# Patient Record
Sex: MALE | Race: WHITE | NOT HISPANIC OR LATINO | URBAN - METROPOLITAN AREA
[De-identification: names, ages, dates, MRNs, and addresses within clinical notes are randomized per-mention and may not be internally consistent; named-entity substitution may affect disease eponyms.]

---

## 2017-08-09 ENCOUNTER — OUTPATIENT (OUTPATIENT)
Dept: OUTPATIENT SERVICES | Facility: HOSPITAL | Age: 82
LOS: 1 days | End: 2017-08-09
Payer: MEDICARE

## 2017-08-09 DIAGNOSIS — C61 MALIGNANT NEOPLASM OF PROSTATE: ICD-10-CM

## 2017-08-09 PROCEDURE — 78306 BONE IMAGING WHOLE BODY: CPT | Mod: 26

## 2017-08-09 PROCEDURE — A9561: CPT

## 2017-08-09 PROCEDURE — 78803 RP LOCLZJ TUM SPECT 1 AREA: CPT

## 2017-08-09 PROCEDURE — 78320: CPT | Mod: 26

## 2017-08-09 PROCEDURE — 78306 BONE IMAGING WHOLE BODY: CPT

## 2017-09-02 ENCOUNTER — EMERGENCY (EMERGENCY)
Facility: HOSPITAL | Age: 82
LOS: 0 days | Discharge: HOME | End: 2017-09-02
Attending: EMERGENCY MEDICINE | Admitting: EMERGENCY MEDICINE
Payer: COMMERCIAL

## 2017-09-02 VITALS
WEIGHT: 184.97 LBS | HEIGHT: 71 IN | RESPIRATION RATE: 15 BRPM | OXYGEN SATURATION: 100 % | DIASTOLIC BLOOD PRESSURE: 64 MMHG | TEMPERATURE: 98 F | HEART RATE: 84 BPM | SYSTOLIC BLOOD PRESSURE: 102 MMHG

## 2017-09-02 VITALS
RESPIRATION RATE: 16 BRPM | SYSTOLIC BLOOD PRESSURE: 122 MMHG | DIASTOLIC BLOOD PRESSURE: 61 MMHG | TEMPERATURE: 98 F | OXYGEN SATURATION: 100 % | HEART RATE: 86 BPM

## 2017-09-02 DIAGNOSIS — E78.5 HYPERLIPIDEMIA, UNSPECIFIED: ICD-10-CM
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
## 2017-09-02 DIAGNOSIS — T18.2XXA FOREIGN BODY IN STOMACH, INITIAL ENCOUNTER: ICD-10-CM

## 2017-09-02 DIAGNOSIS — I10 ESSENTIAL (PRIMARY) HYPERTENSION: ICD-10-CM

## 2017-09-02 DIAGNOSIS — I48.91 UNSPECIFIED ATRIAL FIBRILLATION: ICD-10-CM

## 2017-09-02 DIAGNOSIS — Z79.01 LONG TERM (CURRENT) USE OF ANTICOAGULANTS: ICD-10-CM
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
## 2017-09-02 LAB
ALBUMIN SERPL ELPH-MCNC: 3.3 G/DL — SIGNIFICANT CHANGE UP (ref 3.3–5)
ALP SERPL-CCNC: 106 U/L — SIGNIFICANT CHANGE UP (ref 40–120)
ALT FLD-CCNC: 25 U/L — SIGNIFICANT CHANGE UP (ref 12–78)
ANION GAP SERPL CALC-SCNC: 5 MMOL/L — SIGNIFICANT CHANGE UP (ref 5–17)
APTT BLD: 37.2 SEC — SIGNIFICANT CHANGE UP (ref 27.5–37.4)
AST SERPL-CCNC: 21 U/L — SIGNIFICANT CHANGE UP (ref 15–37)
BASOPHILS # BLD AUTO: 0.2 K/UL — SIGNIFICANT CHANGE UP (ref 0–0.2)
BASOPHILS NFR BLD AUTO: 1 % — SIGNIFICANT CHANGE UP (ref 0–2)
BILIRUB SERPL-MCNC: 0.2 MG/DL — SIGNIFICANT CHANGE UP (ref 0.2–1.2)
BLD GP AB SCN SERPL QL: SIGNIFICANT CHANGE UP
BUN SERPL-MCNC: 21 MG/DL — SIGNIFICANT CHANGE UP (ref 7–23)
CALCIUM SERPL-MCNC: 9.1 MG/DL — SIGNIFICANT CHANGE UP (ref 8.5–10.1)
CHLORIDE SERPL-SCNC: 100 MMOL/L — SIGNIFICANT CHANGE UP (ref 96–108)
CO2 SERPL-SCNC: 29 MMOL/L — SIGNIFICANT CHANGE UP (ref 22–31)
CREAT SERPL-MCNC: 0.81 MG/DL — SIGNIFICANT CHANGE UP (ref 0.5–1.3)
EOSINOPHIL # BLD AUTO: 0.3 K/UL — SIGNIFICANT CHANGE UP (ref 0–0.5)
EOSINOPHIL NFR BLD AUTO: 3 % — SIGNIFICANT CHANGE UP (ref 0–6)
GLUCOSE SERPL-MCNC: 96 MG/DL — SIGNIFICANT CHANGE UP (ref 70–99)
HCT VFR BLD CALC: 34.2 % — LOW (ref 39–50)
HGB BLD-MCNC: 11.9 G/DL — LOW (ref 13–17)
INR BLD: 2.75 RATIO — HIGH (ref 0.88–1.16)
LYMPHOCYTES # BLD AUTO: 1.9 K/UL — SIGNIFICANT CHANGE UP (ref 1–3.3)
LYMPHOCYTES # BLD AUTO: 13 % — SIGNIFICANT CHANGE UP (ref 13–44)
MANUAL DIF COMMENT BLD-IMP: SIGNIFICANT CHANGE UP
MCHC RBC-ENTMCNC: 32.6 PG — SIGNIFICANT CHANGE UP (ref 27–34)
MCHC RBC-ENTMCNC: 34.8 GM/DL — SIGNIFICANT CHANGE UP (ref 32–36)
MCV RBC AUTO: 93.7 FL — SIGNIFICANT CHANGE UP (ref 80–100)
MONOCYTES # BLD AUTO: 0.9 K/UL — SIGNIFICANT CHANGE UP (ref 0–0.9)
MONOCYTES NFR BLD AUTO: 10 % — SIGNIFICANT CHANGE UP (ref 2–14)
NEUTROPHILS # BLD AUTO: 5.5 K/UL — SIGNIFICANT CHANGE UP (ref 1.8–7.4)
NEUTROPHILS NFR BLD AUTO: 65 % — SIGNIFICANT CHANGE UP (ref 43–77)
PLAT MORPH BLD: NORMAL — SIGNIFICANT CHANGE UP
PLATELET # BLD AUTO: 234 K/UL — SIGNIFICANT CHANGE UP (ref 150–400)
POTASSIUM SERPL-MCNC: 4.5 MMOL/L — SIGNIFICANT CHANGE UP (ref 3.5–5.3)
POTASSIUM SERPL-SCNC: 4.5 MMOL/L — SIGNIFICANT CHANGE UP (ref 3.5–5.3)
PROT SERPL-MCNC: 6.5 GM/DL — SIGNIFICANT CHANGE UP (ref 6–8.3)
PROTHROM AB SERPL-ACNC: 30.3 SEC — HIGH (ref 9.8–12.7)
RBC # BLD: 3.65 M/UL — LOW (ref 4.2–5.8)
RBC # FLD: 10.9 % — SIGNIFICANT CHANGE UP (ref 10.3–14.5)
RBC BLD AUTO: SIGNIFICANT CHANGE UP
SODIUM SERPL-SCNC: 134 MMOL/L — LOW (ref 135–145)
TYPE + AB SCN PNL BLD: SIGNIFICANT CHANGE UP
VARIANT LYMPHS # BLD: 8 % — HIGH (ref 0–6)
WBC # BLD: 8.8 K/UL — SIGNIFICANT CHANGE UP (ref 3.8–10.5)
WBC # FLD AUTO: 8.8 K/UL — SIGNIFICANT CHANGE UP (ref 3.8–10.5)

## 2017-09-02 PROCEDURE — 93010 ELECTROCARDIOGRAM REPORT: CPT

## 2017-09-02 PROCEDURE — 71010: CPT | Mod: 26

## 2017-09-02 PROCEDURE — 99285 EMERGENCY DEPT VISIT HI MDM: CPT

## 2017-09-02 PROCEDURE — 74000: CPT | Mod: 26

## 2017-09-02 RX ORDER — SODIUM CHLORIDE 9 MG/ML
3 INJECTION INTRAMUSCULAR; INTRAVENOUS; SUBCUTANEOUS ONCE
Qty: 0 | Refills: 0 | Status: COMPLETED | OUTPATIENT
Start: 2017-09-02 | End: 2017-09-02

## 2017-09-02 RX ADMIN — SODIUM CHLORIDE 3 MILLILITER(S): 9 INJECTION INTRAMUSCULAR; INTRAVENOUS; SUBCUTANEOUS at 17:57

## 2017-09-02 NOTE — ED ADULT NURSE NOTE - FALL HARM RISK
age(85 years old or older)/bones(Osteoporosis,prev fx,steroid use,metastatic bone ca/surgery/coagulation(Bleeding disorder R/T clinical cond/anti-coags)

## 2017-09-02 NOTE — ED PROVIDER NOTE - OBJECTIVE STATEMENT
85 y/o male pmhx dementia, AFIB on Coumadin, HTN, HLD presents to ED from [facility] due to swallowing a metal soda can top ~4pm today. As per wife pt tried to cough up top but was unable to do so. Denies vomiting.

## 2017-09-02 NOTE — ED ADULT NURSE NOTE - OBJECTIVE STATEMENT
Pt comes from [facility], sent to  d/t swallowing the top of a soda can at 4pm today. Pt currently in no acute distress, no SOB and states he feels fine.

## 2017-09-02 NOTE — ED ADULT TRIAGE NOTE - CHIEF COMPLAINT QUOTE
Patient coming from [facility]. Pt swallowed the metal piece of a soda can. Patient speaking full clear complete sentences in no acute distress.

## 2017-09-02 NOTE — ED PROVIDER NOTE - PROGRESS NOTE DETAILS
Attending Stephanie: Phone call to on call GI Dr Avendaño to discuss case. Currently does not rec endoscopy or admission, expectant management with close obs and rted for any abd pain or blood in stool. Discussed this with wife and [facility]

## 2017-09-02 NOTE — ED ADULT NURSE NOTE - CHIEF COMPLAINT QUOTE
Patient coming from [facility]. Pt swallowed the metal piece of a soda can. Patient speaking full clear complete sentences in no acute distress.

## 2017-11-24 ENCOUNTER — EMERGENCY (EMERGENCY)
Facility: HOSPITAL | Age: 82
LOS: 0 days | Discharge: HOME | End: 2017-11-24
Attending: EMERGENCY MEDICINE | Admitting: EMERGENCY MEDICINE
Payer: MEDICARE

## 2017-11-24 VITALS
TEMPERATURE: 98 F | DIASTOLIC BLOOD PRESSURE: 74 MMHG | SYSTOLIC BLOOD PRESSURE: 130 MMHG | RESPIRATION RATE: 18 BRPM | HEART RATE: 80 BPM | OXYGEN SATURATION: 99 %

## 2017-11-24 VITALS — WEIGHT: 125 LBS

## 2017-11-24 DIAGNOSIS — G31.9 DEGENERATIVE DISEASE OF NERVOUS SYSTEM, UNSPECIFIED: ICD-10-CM

## 2017-11-24 DIAGNOSIS — Z91.81 HISTORY OF FALLING: ICD-10-CM

## 2017-11-24 DIAGNOSIS — Z79.01 LONG TERM (CURRENT) USE OF ANTICOAGULANTS: ICD-10-CM

## 2017-11-24 DIAGNOSIS — I48.91 UNSPECIFIED ATRIAL FIBRILLATION: ICD-10-CM

## 2017-11-24 PROCEDURE — 70450 CT HEAD/BRAIN W/O DYE: CPT | Mod: 26

## 2017-11-24 PROCEDURE — 93010 ELECTROCARDIOGRAM REPORT: CPT

## 2017-11-24 PROCEDURE — 99285 EMERGENCY DEPT VISIT HI MDM: CPT

## 2017-11-24 NOTE — ED PROVIDER NOTE - OBJECTIVE STATEMENT
85 y/o M with PMhx of afib on coumadin presents to the ED s/p fall one hour ago. Pt tripped in his kitchen and hit his head. No LOC. Trauma alert called. PMD: Dr. Villegas.

## 2018-05-11 ENCOUNTER — INPATIENT (INPATIENT)
Facility: HOSPITAL | Age: 83
LOS: 2 days | Discharge: NURSING, REHAB OR LONG-TERM CARE | End: 2018-05-14
Attending: FAMILY MEDICINE | Admitting: FAMILY MEDICINE
Payer: MEDICARE

## 2018-05-11 VITALS
HEART RATE: 77 BPM | TEMPERATURE: 97 F | OXYGEN SATURATION: 94 % | DIASTOLIC BLOOD PRESSURE: 85 MMHG | WEIGHT: 115.08 LBS | SYSTOLIC BLOOD PRESSURE: 148 MMHG | RESPIRATION RATE: 17 BRPM

## 2018-05-11 LAB
ALBUMIN SERPL ELPH-MCNC: 3.9 G/DL — SIGNIFICANT CHANGE UP (ref 3.3–5)
ALP SERPL-CCNC: 120 U/L — SIGNIFICANT CHANGE UP (ref 40–120)
ALT FLD-CCNC: 18 U/L — SIGNIFICANT CHANGE UP (ref 12–78)
ANION GAP SERPL CALC-SCNC: 8 MMOL/L — SIGNIFICANT CHANGE UP (ref 5–17)
APPEARANCE UR: CLEAR — SIGNIFICANT CHANGE UP
APTT BLD: 33.6 SEC — SIGNIFICANT CHANGE UP (ref 27.5–37.4)
AST SERPL-CCNC: 23 U/L — SIGNIFICANT CHANGE UP (ref 15–37)
BACTERIA # UR AUTO: (no result)
BASOPHILS # BLD AUTO: 0.06 K/UL — SIGNIFICANT CHANGE UP (ref 0–0.2)
BASOPHILS NFR BLD AUTO: 0.7 % — SIGNIFICANT CHANGE UP (ref 0–2)
BILIRUB SERPL-MCNC: 0.7 MG/DL — SIGNIFICANT CHANGE UP (ref 0.2–1.2)
BILIRUB UR-MCNC: NEGATIVE — SIGNIFICANT CHANGE UP
BLD GP AB SCN SERPL QL: SIGNIFICANT CHANGE UP
BUN SERPL-MCNC: 15 MG/DL — SIGNIFICANT CHANGE UP (ref 7–23)
CALCIUM SERPL-MCNC: 9.7 MG/DL — SIGNIFICANT CHANGE UP (ref 8.5–10.1)
CHLORIDE SERPL-SCNC: 97 MMOL/L — SIGNIFICANT CHANGE UP (ref 96–108)
CK SERPL-CCNC: 94 U/L — SIGNIFICANT CHANGE UP (ref 26–308)
CO2 SERPL-SCNC: 27 MMOL/L — SIGNIFICANT CHANGE UP (ref 22–31)
COLOR SPEC: YELLOW — SIGNIFICANT CHANGE UP
COMMENT - URINE: SIGNIFICANT CHANGE UP
CREAT SERPL-MCNC: 1.03 MG/DL — SIGNIFICANT CHANGE UP (ref 0.5–1.3)
DIFF PNL FLD: (no result)
EOSINOPHIL # BLD AUTO: 0.14 K/UL — SIGNIFICANT CHANGE UP (ref 0–0.5)
EOSINOPHIL NFR BLD AUTO: 1.7 % — SIGNIFICANT CHANGE UP (ref 0–6)
GLUCOSE SERPL-MCNC: 90 MG/DL — SIGNIFICANT CHANGE UP (ref 70–99)
GLUCOSE UR QL: NEGATIVE MG/DL — SIGNIFICANT CHANGE UP
HCT VFR BLD CALC: 39.4 % — SIGNIFICANT CHANGE UP (ref 39–50)
HGB BLD-MCNC: 13.7 G/DL — SIGNIFICANT CHANGE UP (ref 13–17)
IMM GRANULOCYTES NFR BLD AUTO: 0.6 % — SIGNIFICANT CHANGE UP (ref 0–1.5)
INR BLD: 2.31 RATIO — HIGH (ref 0.88–1.16)
KETONES UR-MCNC: (no result)
LEUKOCYTE ESTERASE UR-ACNC: (no result)
LIDOCAIN IGE QN: 225 U/L — SIGNIFICANT CHANGE UP (ref 73–393)
LYMPHOCYTES # BLD AUTO: 1.94 K/UL — SIGNIFICANT CHANGE UP (ref 1–3.3)
LYMPHOCYTES # BLD AUTO: 23.9 % — SIGNIFICANT CHANGE UP (ref 13–44)
MCHC RBC-ENTMCNC: 31.9 PG — SIGNIFICANT CHANGE UP (ref 27–34)
MCHC RBC-ENTMCNC: 34.8 GM/DL — SIGNIFICANT CHANGE UP (ref 32–36)
MCV RBC AUTO: 91.6 FL — SIGNIFICANT CHANGE UP (ref 80–100)
MONOCYTES # BLD AUTO: 0.8 K/UL — SIGNIFICANT CHANGE UP (ref 0–0.9)
MONOCYTES NFR BLD AUTO: 9.9 % — SIGNIFICANT CHANGE UP (ref 2–14)
NEUTROPHILS # BLD AUTO: 5.13 K/UL — SIGNIFICANT CHANGE UP (ref 1.8–7.4)
NEUTROPHILS NFR BLD AUTO: 63.2 % — SIGNIFICANT CHANGE UP (ref 43–77)
NITRITE UR-MCNC: NEGATIVE — SIGNIFICANT CHANGE UP
NRBC # BLD: 0 /100 WBCS — SIGNIFICANT CHANGE UP (ref 0–0)
PH UR: 8 — SIGNIFICANT CHANGE UP (ref 5–8)
PLATELET # BLD AUTO: 222 K/UL — SIGNIFICANT CHANGE UP (ref 150–400)
POTASSIUM SERPL-MCNC: 4.3 MMOL/L — SIGNIFICANT CHANGE UP (ref 3.5–5.3)
POTASSIUM SERPL-SCNC: 4.3 MMOL/L — SIGNIFICANT CHANGE UP (ref 3.5–5.3)
PROT SERPL-MCNC: 7.8 GM/DL — SIGNIFICANT CHANGE UP (ref 6–8.3)
PROT UR-MCNC: NEGATIVE MG/DL — SIGNIFICANT CHANGE UP
PROTHROM AB SERPL-ACNC: 25.4 SEC — HIGH (ref 9.8–12.7)
RBC # BLD: 4.3 M/UL — SIGNIFICANT CHANGE UP (ref 4.2–5.8)
RBC # FLD: 12.7 % — SIGNIFICANT CHANGE UP (ref 10.3–14.5)
RBC CASTS # UR COMP ASSIST: NEGATIVE /HPF — SIGNIFICANT CHANGE UP (ref 0–4)
SODIUM SERPL-SCNC: 132 MMOL/L — LOW (ref 135–145)
SP GR SPEC: 1.01 — SIGNIFICANT CHANGE UP (ref 1.01–1.02)
TROPONIN I SERPL-MCNC: 0.02 NG/ML — SIGNIFICANT CHANGE UP (ref 0.01–0.04)
TYPE + AB SCN PNL BLD: SIGNIFICANT CHANGE UP
UROBILINOGEN FLD QL: NEGATIVE MG/DL — SIGNIFICANT CHANGE UP
WBC # BLD: 8.12 K/UL — SIGNIFICANT CHANGE UP (ref 3.8–10.5)
WBC # FLD AUTO: 8.12 K/UL — SIGNIFICANT CHANGE UP (ref 3.8–10.5)
WBC UR QL: SIGNIFICANT CHANGE UP

## 2018-05-11 PROCEDURE — 73502 X-RAY EXAM HIP UNI 2-3 VIEWS: CPT | Mod: 26

## 2018-05-11 PROCEDURE — 70450 CT HEAD/BRAIN W/O DYE: CPT | Mod: 26

## 2018-05-11 PROCEDURE — 73030 X-RAY EXAM OF SHOULDER: CPT | Mod: 26,LT

## 2018-05-11 PROCEDURE — 73552 X-RAY EXAM OF FEMUR 2/>: CPT | Mod: 26

## 2018-05-11 PROCEDURE — 93010 ELECTROCARDIOGRAM REPORT: CPT

## 2018-05-11 PROCEDURE — 71045 X-RAY EXAM CHEST 1 VIEW: CPT | Mod: 26

## 2018-05-11 PROCEDURE — 99285 EMERGENCY DEPT VISIT HI MDM: CPT

## 2018-05-11 PROCEDURE — 74176 CT ABD & PELVIS W/O CONTRAST: CPT | Mod: 26

## 2018-05-11 PROCEDURE — 73060 X-RAY EXAM OF HUMERUS: CPT | Mod: 26,LT

## 2018-05-11 PROCEDURE — 73080 X-RAY EXAM OF ELBOW: CPT | Mod: 26,LT

## 2018-05-11 PROCEDURE — 72125 CT NECK SPINE W/O DYE: CPT | Mod: 26

## 2018-05-11 PROCEDURE — 71250 CT THORAX DX C-: CPT | Mod: 26

## 2018-05-11 RX ORDER — QUETIAPINE FUMARATE 200 MG/1
37.5 TABLET, FILM COATED ORAL
Qty: 0 | Refills: 0 | Status: DISCONTINUED | OUTPATIENT
Start: 2018-05-11 | End: 2018-05-12

## 2018-05-11 RX ORDER — QUETIAPINE FUMARATE 200 MG/1
1.5 TABLET, FILM COATED ORAL
Qty: 0 | Refills: 0 | COMMUNITY

## 2018-05-11 RX ORDER — QUETIAPINE FUMARATE 200 MG/1
2.5 TABLET, FILM COATED ORAL
Qty: 0 | Refills: 0 | COMMUNITY

## 2018-05-11 RX ORDER — PHYTONADIONE (VIT K1) 5 MG
5 TABLET ORAL ONCE
Qty: 0 | Refills: 0 | Status: COMPLETED | OUTPATIENT
Start: 2018-05-11 | End: 2018-05-11

## 2018-05-11 RX ORDER — DONEPEZIL HYDROCHLORIDE 10 MG/1
10 TABLET, FILM COATED ORAL AT BEDTIME
Qty: 0 | Refills: 0 | Status: DISCONTINUED | OUTPATIENT
Start: 2018-05-11 | End: 2018-05-12

## 2018-05-11 RX ORDER — DIVALPROEX SODIUM 500 MG/1
1 TABLET, DELAYED RELEASE ORAL
Qty: 0 | Refills: 0 | COMMUNITY

## 2018-05-11 RX ORDER — HYDROMORPHONE HYDROCHLORIDE 2 MG/ML
1 INJECTION INTRAMUSCULAR; INTRAVENOUS; SUBCUTANEOUS ONCE
Qty: 0 | Refills: 0 | Status: DISCONTINUED | OUTPATIENT
Start: 2018-05-11 | End: 2018-05-11

## 2018-05-11 RX ORDER — OXYBUTYNIN CHLORIDE 5 MG
1 TABLET ORAL
Qty: 0 | Refills: 0 | COMMUNITY

## 2018-05-11 RX ORDER — MORPHINE SULFATE 50 MG/1
4 CAPSULE, EXTENDED RELEASE ORAL EVERY 4 HOURS
Qty: 0 | Refills: 0 | Status: DISCONTINUED | OUTPATIENT
Start: 2018-05-11 | End: 2018-05-12

## 2018-05-11 RX ORDER — SODIUM CHLORIDE 9 MG/ML
1000 INJECTION, SOLUTION INTRAVENOUS
Qty: 0 | Refills: 0 | Status: DISCONTINUED | OUTPATIENT
Start: 2018-05-11 | End: 2018-05-11

## 2018-05-11 RX ORDER — SODIUM CHLORIDE 9 MG/ML
500 INJECTION INTRAMUSCULAR; INTRAVENOUS; SUBCUTANEOUS ONCE
Qty: 0 | Refills: 0 | Status: COMPLETED | OUTPATIENT
Start: 2018-05-11 | End: 2018-05-11

## 2018-05-11 RX ORDER — POTASSIUM CHLORIDE 20 MEQ
20 PACKET (EA) ORAL DAILY
Qty: 0 | Refills: 0 | Status: DISCONTINUED | OUTPATIENT
Start: 2018-05-11 | End: 2018-05-12

## 2018-05-11 RX ORDER — MORPHINE SULFATE 50 MG/1
2 CAPSULE, EXTENDED RELEASE ORAL EVERY 4 HOURS
Qty: 0 | Refills: 0 | Status: DISCONTINUED | OUTPATIENT
Start: 2018-05-11 | End: 2018-05-12

## 2018-05-11 RX ORDER — TRAMADOL HYDROCHLORIDE 50 MG/1
25 TABLET ORAL EVERY 6 HOURS
Qty: 0 | Refills: 0 | Status: DISCONTINUED | OUTPATIENT
Start: 2018-05-11 | End: 2018-05-12

## 2018-05-11 RX ORDER — DIVALPROEX SODIUM 500 MG/1
250 TABLET, DELAYED RELEASE ORAL
Qty: 0 | Refills: 0 | Status: DISCONTINUED | OUTPATIENT
Start: 2018-05-11 | End: 2018-05-12

## 2018-05-11 RX ORDER — SODIUM CHLORIDE 9 MG/ML
1000 INJECTION INTRAMUSCULAR; INTRAVENOUS; SUBCUTANEOUS
Qty: 0 | Refills: 0 | Status: DISCONTINUED | OUTPATIENT
Start: 2018-05-11 | End: 2018-05-12

## 2018-05-11 RX ORDER — QUETIAPINE FUMARATE 200 MG/1
75 TABLET, FILM COATED ORAL
Qty: 0 | Refills: 0 | Status: DISCONTINUED | OUTPATIENT
Start: 2018-05-11 | End: 2018-05-12

## 2018-05-11 RX ADMIN — DONEPEZIL HYDROCHLORIDE 10 MILLIGRAM(S): 10 TABLET, FILM COATED ORAL at 22:36

## 2018-05-11 RX ADMIN — SODIUM CHLORIDE 500 MILLILITER(S): 9 INJECTION INTRAMUSCULAR; INTRAVENOUS; SUBCUTANEOUS at 13:40

## 2018-05-11 RX ADMIN — HYDROMORPHONE HYDROCHLORIDE 1 MILLIGRAM(S): 2 INJECTION INTRAMUSCULAR; INTRAVENOUS; SUBCUTANEOUS at 13:13

## 2018-05-11 RX ADMIN — Medication 5 MILLIGRAM(S): at 19:34

## 2018-05-11 RX ADMIN — TRAMADOL HYDROCHLORIDE 25 MILLIGRAM(S): 50 TABLET ORAL at 22:36

## 2018-05-11 RX ADMIN — QUETIAPINE FUMARATE 75 MILLIGRAM(S): 200 TABLET, FILM COATED ORAL at 22:37

## 2018-05-11 RX ADMIN — TRAMADOL HYDROCHLORIDE 25 MILLIGRAM(S): 50 TABLET ORAL at 22:53

## 2018-05-11 RX ADMIN — HYDROMORPHONE HYDROCHLORIDE 1 MILLIGRAM(S): 2 INJECTION INTRAMUSCULAR; INTRAVENOUS; SUBCUTANEOUS at 19:49

## 2018-05-11 NOTE — CONSULT NOTE ADULT - SUBJECTIVE AND OBJECTIVE BOX
86y Male ambulator with a walker presents c/o L shoulder/hip pain and inability to ambulate sp mechanical fall. Pt has dementia and is a poor historian. History obtained from Pt's wife at bedside. Pt's wife states Pt slipped out of wheelchair and fell to the ground. Pt was unable to ambulate after the fall. Denies HS/LOC. Denies numbness/tingling. Denies pain/injury elsewhere. Of note, Pt had previous R hip hemiarthroplasty in 2016 by Dr. Meza. Pt also has had R knee arthroscopy many years ago by a surgeon who is now retired.    CONSTITUTIONAL: No fever or chills  HEENT:  No headache, no sore throat  RESPIRATORY: No cough, wheezing, or shortness of breath  CARDIOVASCULAR: No chest pain, palpitations, or leg swelling  GASTROINTESTINAL: No nausea, vomiting, or diarrhea  GENITOURINARY: No dysuria, frequency, or hematuria  NEUROLOGICAL: no focal weakness or dizziness  SKIN:  No rashes or lesions   MUSCULOSKELETAL: no myalgias   PSYCHIATRIC: No depression or anxiety    PAST MEDICAL & SURGICAL HISTORY:  Dementia without behavioral disturbance, unspecified dementia type  Afib  No significant past surgical history    Home Medications:  Cardizem  mg/24 hours oral capsule, extended release: 1 cap(s) orally once a day (11 May 2018 17:40)  Coumadin 5 mg oral tablet: 1 tab(s) orally once a day (11 May 2018 17:40)  divalproex sodium 250 mg oral delayed release tablet: 1 tab(s) orally 2 times a day (11 May 2018 17:40)  donepezil 10 mg oral tablet: 1 tab(s) orally once a day (at bedtime) (11 May 2018 17:40)  furosemide 20 mg oral tablet: 1 tab(s) orally once a day (11 May 2018 17:40)  furosemide 20 mg oral tablet: 1 tab(s) orally once a day (11 May 2018 17:40)  oxybutynin 5 mg oral tablet: 1 tab(s) orally 2 times a day (11 May 2018 17:40)  potassium chloride 20 mEq oral tablet, extended release: 1 tab(s) orally once a day (11 May 2018 17:40)  QUEtiapine 25 mg oral tablet: 1.5 tab(s) orally once a day in the afternoon (11 May 2018 17:40)  QUEtiapine 25 mg oral tablet: 2.5 tab(s) orally once a day (in the morning) (11 May 2018 17:40)  QUEtiapine 50 mg oral tablet: 1.5 tab(s) orally once a day (at bedtime) (11 May 2018 17:40)  Vitamin B-12 1000 mcg oral tablet: 1 tab(s) orally once a day (11 May 2018 17:40)  Vitamin D3 1000 intl units oral capsule: 1 cap(s) orally once a day (11 May 2018 17:40)      Allergies    No Known Allergies    Intolerances                              13.7   8.12  )-----------( 222      ( 11 May 2018 13:04 )             39.4     11 May 2018 13:04    132    |  97     |  15     ----------------------------<  90     4.3     |  27     |  1.03     Ca    9.7        11 May 2018 13:04    TPro  7.8    /  Alb  3.9    /  TBili  0.7    /  DBili  x      /  AST  23     /  ALT  18     /  AlkPhos  120    11 May 2018 13:04    PT/INR - ( 11 May 2018 13:04 )   PT: 25.4 sec;   INR: 2.31 ratio         PTT - ( 11 May 2018 13:04 )  PTT:33.6 sec  Vital Signs Last 24 Hrs  T(C): 36.7 (05-11-18 @ 16:02), Max: 36.7 (05-11-18 @ 16:02)  T(F): 98 (05-11-18 @ 16:02), Max: 98 (05-11-18 @ 16:02)  HR: 96 (05-11-18 @ 16:02) (77 - 96)  BP: 108/72 (05-11-18 @ 16:02) (108/72 - 148/85)  BP(mean): --  RR: 17 (05-11-18 @ 12:39) (17 - 17)  SpO2: 99% (05-11-18 @ 16:02) (94% - 99%)    Imaging: XR L Hip: Intertrochanteric hip fracture  XR L Shoulder: Nondisplaced proximal humerus fx    Physical Exam  Gen: NAD  LLE: skin intact, short/ER leg, unable to SLR LLE, + log roll LLE, +ttp hip/groin, no ttp elsewhere, +ehl/fhl/ta/gs function, SILT L3-S1, no calf ttp, +dp/pt pulse intact, compartments soft    LUE: Skin intact, moderate swelling to L shoulder, +ttp over shoulder, no ttp elsewhere, +AIN/PIN/M/R/U/Msc/Ax nerves intact, SILT C5-T1, +radial pulse, compartments soft/compressible, full painless ROM at elbow/wrist      Secondary survey: benign, nv intact, able to SLR contralateral leg, negative log roll contralateral leg, no bony ttp elsewhere

## 2018-05-11 NOTE — ED PROVIDER NOTE - MEDICAL DECISION MAKING DETAILS
Plan for labs including cardiac workup, CT abd/ pelvis, CT c-spine, CT head, CXR, XR elbow, XR hip, EKG.

## 2018-05-11 NOTE — H&P ADULT - NSHPPHYSICALEXAM_GEN_ALL_CORE
PHYSICAL EXAM:    GENERAL: NAD, sleepy  HEAD:  NC/AT  EYES: EOMI, PERRLA, no scleral icterus  HEENT: Moist mucous membranes  NECK: Supple, No JVD  CNS:  Alert & Oriented X3, Motor Strength 5/5 B/L lower extremities; 5/5 in right upper extremity.  left arm in sling.  DTRs 2+ intact   LUNG: Clear to auscultation bilaterally; No rales, rhonchi, wheezing, or rubs  HEART: RRR; No murmurs, rubs, or gallops  ABDOMEN: +BS, ST/ND/NT  GENITOURINARY- Voiding, Bladder not distended  EXTREMITIES:  2+ Peripheral Pulses, No clubbing, cyanosis, or edema  MUSCULOSKELTAL- Joints normal ROM, no Muscle or joint tenderness PHYSICAL EXAM:    GENERAL: NAD, sleepy  HEAD:  NC/AT  EYES: EOMI, PERRLA, no scleral icterus  HEENT: Moist mucous membranes  NECK: Supple, No JVD  CNS:  Alert & Oriented X3, Motor Strength 5/5 right lower extremity;  left leg- shortened with internal rotation, 5/5 in right upper extremity.  left arm in sling.  DTRs 2+ intact   LUNG: Clear to auscultation bilaterally; No rales, rhonchi, wheezing, or rubs  HEART: RRR; No murmurs, rubs, or gallops  ABDOMEN: +BS, ST/ND/NT  GENITOURINARY- Voiding, Bladder not distended  EXTREMITIES:  2+ Peripheral Pulses, No clubbing, cyanosis, or edema  MUSCULOSKELTAL- Joints normal ROM, no Muscle or joint tenderness

## 2018-05-11 NOTE — ED ADULT NURSE REASSESSMENT NOTE - NS ED NURSE REASSESS COMMENT FT1
Notified Dr. Mateusz Sandra of discrepancy between order written to admit to Med-Surg Unit and H&P which states admit to Telemetry.  As per MD Sandra, patient is to be admitted to Med-Surg Unit.

## 2018-05-11 NOTE — H&P ADULT - ASSESSMENT
85 y/o male     mechanical fall, no head injury or LOC, Cth-neg, with left intertochanteric fx, plan to go to OR tomorrow, h/o afib on vka, with inr>2.  no s/s of fluid overload or LE edema, no murmur appreciated. Caldera index for cardiac risk-0.4%, patient surgically risk is moderate, surgical risk moderate. patient is medically optimized for surgery, pending INR level<2.    -admit to tele   -ortho consult  -hold vka tonight  -will give vit k, check inr in am   -gentle hydration   -npo p mn   -pain meds as needed    afib, rate controlled.  ecg-neg  -hold am po meds, treat with iv push as needed    dementia   -cont aricept     hld  -cont statin    dvt prophylaxis   -on vka, inr>2  -ipc bilaterally 87 y/o male     mechanical fall, no head injury or LOC, Cth-neg, with left intertochanteric hip fx, plan to go to OR tomorrow, h/o afib on vka, with inr>2.  no s/s of fluid overload or LE edema, no murmur appreciated. Caldera index for cardiac risk-0.4%, patient surgically risk is moderate, surgical risk moderate. patient is medically optimized for surgery, pending INR level<2.    -admit to tele   -ortho consult  -hold vka tonight  -will give vit k, check inr in am   -gentle hydration   -npo p mn   -pain meds as needed    afib, rate controlled.  bp borderline.  ecg-afib, no acute changes  -hold am po med as on long acting ccb, treat with iv push as needed    dementia,  -cont aricept   -cont seroquel, depakote    dvt prophylaxis   -on vka, inr>2  -ipc bilaterally 85 y/o male     mechanical fall, no head injury or LOC, Cth-neg, with left intertochanteric hip fx, plan to go to OR tomorrow, h/o afib on vka, with inr>2.  no s/s of fluid overload or LE edema, no murmur appreciated. Caldera index for cardiac risk-0.4%, patient surgically risk is moderate, surgical risk moderate. patient is medically optimized for surgery, pending INR level<2.    -admit to med-surg  -ortho consult  -hold vka tonight  -will give vit k, check inr in am   -gentle hydration   -npo p mn   -pain meds as needed    afib, rate controlled.  bp borderline.  ecg-afib, no acute changes  -hold am po med as on long acting ccb, treat with iv push as needed    dementia,  -cont aricept   -cont seroquel, depakote    dvt prophylaxis   -on vka, inr>2  -ipc bilaterally

## 2018-05-11 NOTE — H&P ADULT - HISTORY OF PRESENT ILLNESS
87 y/o mainly wheelchair bound male with h/o afib on vka and dementia presents after fall out of his wheelchair at home today.  history provided by wife as patient is sleeping due to pain meds.  states that he was leaning over out of chair and fell on his left side.  no LOC, head trauma.  wife and aide witnessed fall.    In ED, noted to have left intertrochanteric fx, s/p dilaudid for pain. 85 y/o mainly wheelchair bound male with h/o afib on vka and dementia presents after fall out of his wheelchair at home today.  history provided by wife as patient has baseline dementia.  states that he was leaning over out of chair and fell on his left side.  unable to ambulate, c/o severe pain.  EMS was called.  no LOC, head trauma.  wife and home aide witnessed fall.    In ED, noted to have left intertrochanteric fx, s/p dilaudid for pain.

## 2018-05-11 NOTE — ED ADULT TRIAGE NOTE - CHIEF COMPLAINT QUOTE
pt presents to ED due to possible left hip fx and left arm pain pt was in his wheelchair on his lawn and slipped out onto his lawn landing on left hip hx of BL hip fx

## 2018-05-11 NOTE — ED PROVIDER NOTE - OBJECTIVE STATEMENT
85 y/o male with PMHx of Afib on coumadin, right partial hip replacement presents to the ED BIBA s/p fall at home PTA c/o left arm pain and left hip pain. Pt was unable to ambulate s/p fall and was lifted back into his wheel chair. Denies head trauma, LOC, NVD.

## 2018-05-11 NOTE — CONSULT NOTE ADULT - ASSESSMENT
A/P: 86y Male with L hip and L proximal humerus fracture  Pain control  NWB LLE, bedrest  NWB LUE in sling  FU labs/imaging  Admit to medical team  Medical clearance/optimization for OR  NPO after midnight for OR tomorrow 5/12  IVF while NPO  Hold chemical DVT ppx after midnight  Will give 5mg Vit K for elevated INR  Discussed with attending

## 2018-05-11 NOTE — CHART NOTE - NSCHARTNOTEFT_GEN_A_CORE
Dx: Left intertrochanteric hip fracture    Sx: Left hip intramedullary nail    Surgeon: Dr. Ambrocio    Clearance: Pending    Consent: Obtained (Wife)    H&P: Pending                          13.7   8.12  )-----------( 222      ( 11 May 2018 13:04 )             39.4     11 May 2018 13:04    132    |  97     |  15     ----------------------------<  90     4.3     |  27     |  1.03     Ca    9.7        11 May 2018 13:04    TPro  7.8    /  Alb  3.9    /  TBili  0.7    /  DBili  x      /  AST  23     /  ALT  18     /  AlkPhos  120    11 May 2018 13:04    PT/INR - ( 11 May 2018 13:04 )   PT: 25.4 sec;   INR: 2.31 ratio         PTT - ( 11 May 2018 13:04 )  PTT:33.6 sec    Type + Screen 05-11 @ 13:04  --  --  NEG  --  --  B POS      UA: Done    EKG: Done    CXR: Done    A/P: 86M with L intertrochanteric hip fracture  -Pain control  -NWB LLE, bedrest  -Plan for OR tomorrow (5/12) with Dr. Ambrocio for L hip IMN  -NPO after midnight  -IVF while NPO  -Hold chemical DVT ppx  -Medical optimization/clearance for OR tomorrow 5/12

## 2018-05-11 NOTE — H&P ADULT - NSHPLABSRESULTS_GEN_ALL_CORE
13.7   8.12   )----------(  222       ( 11 May 2018 13:04 )               39.4      132    |  97     |  15     ----------------------------<  90         ( 11 May 2018 13:04 )  4.3     |  27     |  1.03     Ca    9.7        ( 11 May 2018 13:04 )    TPro  7.8    /  Alb  3.9    /  TBili  0.7    /  DBili  x      /  AST  23     /  ALT  18     /  AlkPhos  120    ( 11 May 2018 13:04 )    LIVER FUNCTIONS - ( 11 May 2018 13:04 )  Alb: 3.9 g/dL / Pro: 7.8 gm/dL / ALK PHOS: 120 U/L / ALT: 18 U/L / AST: 23 U/L / GGT: x           PT/INR -  25.4 sec / 2.31 ratio   ( 11 May 2018 13:04 )       PTT -  33.6 sec   ( 11 May 2018 13:04 )  CAPILLARY BLOOD GLUCOSE    CARDIAC MARKERS ( 11 May 2018 13:04 )  0.018 ng/mL / x     / 94 U/L / x     / x          Urinalysis Basic - ( 11 May 2018 17:03 )    Color: Yellow / Appearance: Clear / S.015 / pH: x  Gluc: x / Ketone: Trace  / Bili: Negative / Urobili: Negative mg/dL   Blood: x / Protein: Negative mg/dL / Nitrite: Negative   Leuk Esterase: Trace / RBC: x / WBC x   Sq Epi: x / Non Sq Epi: x / Bacteria: x

## 2018-05-12 ENCOUNTER — TRANSCRIPTION ENCOUNTER (OUTPATIENT)
Age: 83
End: 2018-05-12

## 2018-05-12 LAB
ANION GAP SERPL CALC-SCNC: 8 MMOL/L — SIGNIFICANT CHANGE UP (ref 5–17)
ANION GAP SERPL CALC-SCNC: 9 MMOL/L — SIGNIFICANT CHANGE UP (ref 5–17)
APTT BLD: 31.3 SEC — SIGNIFICANT CHANGE UP (ref 27.5–37.4)
BUN SERPL-MCNC: 22 MG/DL — SIGNIFICANT CHANGE UP (ref 7–23)
BUN SERPL-MCNC: 22 MG/DL — SIGNIFICANT CHANGE UP (ref 7–23)
CALCIUM SERPL-MCNC: 8.5 MG/DL — SIGNIFICANT CHANGE UP (ref 8.5–10.1)
CALCIUM SERPL-MCNC: 8.5 MG/DL — SIGNIFICANT CHANGE UP (ref 8.5–10.1)
CHLORIDE SERPL-SCNC: 102 MMOL/L — SIGNIFICANT CHANGE UP (ref 96–108)
CHLORIDE SERPL-SCNC: 104 MMOL/L — SIGNIFICANT CHANGE UP (ref 96–108)
CO2 SERPL-SCNC: 24 MMOL/L — SIGNIFICANT CHANGE UP (ref 22–31)
CO2 SERPL-SCNC: 24 MMOL/L — SIGNIFICANT CHANGE UP (ref 22–31)
CREAT SERPL-MCNC: 0.97 MG/DL — SIGNIFICANT CHANGE UP (ref 0.5–1.3)
CREAT SERPL-MCNC: 0.98 MG/DL — SIGNIFICANT CHANGE UP (ref 0.5–1.3)
GLUCOSE SERPL-MCNC: 126 MG/DL — HIGH (ref 70–99)
GLUCOSE SERPL-MCNC: 134 MG/DL — HIGH (ref 70–99)
HCT VFR BLD CALC: 30.8 % — LOW (ref 39–50)
HCT VFR BLD CALC: 31.2 % — LOW (ref 39–50)
HGB BLD-MCNC: 10.3 G/DL — LOW (ref 13–17)
HGB BLD-MCNC: 10.7 G/DL — LOW (ref 13–17)
INR BLD: 1.73 RATIO — HIGH (ref 0.88–1.16)
MCHC RBC-ENTMCNC: 32.1 PG — SIGNIFICANT CHANGE UP (ref 27–34)
MCHC RBC-ENTMCNC: 32.2 PG — SIGNIFICANT CHANGE UP (ref 27–34)
MCHC RBC-ENTMCNC: 33.4 GM/DL — SIGNIFICANT CHANGE UP (ref 32–36)
MCHC RBC-ENTMCNC: 34.3 GM/DL — SIGNIFICANT CHANGE UP (ref 32–36)
MCV RBC AUTO: 94 FL — SIGNIFICANT CHANGE UP (ref 80–100)
MCV RBC AUTO: 96 FL — SIGNIFICANT CHANGE UP (ref 80–100)
NRBC # BLD: 0 /100 WBCS — SIGNIFICANT CHANGE UP (ref 0–0)
NRBC # BLD: 0 /100 WBCS — SIGNIFICANT CHANGE UP (ref 0–0)
PLATELET # BLD AUTO: 148 K/UL — LOW (ref 150–400)
PLATELET # BLD AUTO: 158 K/UL — SIGNIFICANT CHANGE UP (ref 150–400)
POTASSIUM SERPL-MCNC: 4.4 MMOL/L — SIGNIFICANT CHANGE UP (ref 3.5–5.3)
POTASSIUM SERPL-MCNC: 4.4 MMOL/L — SIGNIFICANT CHANGE UP (ref 3.5–5.3)
POTASSIUM SERPL-SCNC: 4.4 MMOL/L — SIGNIFICANT CHANGE UP (ref 3.5–5.3)
POTASSIUM SERPL-SCNC: 4.4 MMOL/L — SIGNIFICANT CHANGE UP (ref 3.5–5.3)
PROTHROM AB SERPL-ACNC: 18.9 SEC — HIGH (ref 9.8–12.7)
RBC # BLD: 3.21 M/UL — LOW (ref 4.2–5.8)
RBC # BLD: 3.32 M/UL — LOW (ref 4.2–5.8)
RBC # FLD: 12.8 % — SIGNIFICANT CHANGE UP (ref 10.3–14.5)
RBC # FLD: 12.9 % — SIGNIFICANT CHANGE UP (ref 10.3–14.5)
SODIUM SERPL-SCNC: 135 MMOL/L — SIGNIFICANT CHANGE UP (ref 135–145)
SODIUM SERPL-SCNC: 136 MMOL/L — SIGNIFICANT CHANGE UP (ref 135–145)
WBC # BLD: 13.69 K/UL — HIGH (ref 3.8–10.5)
WBC # BLD: 17.9 K/UL — HIGH (ref 3.8–10.5)
WBC # FLD AUTO: 13.69 K/UL — HIGH (ref 3.8–10.5)
WBC # FLD AUTO: 17.9 K/UL — HIGH (ref 3.8–10.5)

## 2018-05-12 RX ORDER — SODIUM CHLORIDE 9 MG/ML
1000 INJECTION, SOLUTION INTRAVENOUS
Qty: 0 | Refills: 0 | Status: DISCONTINUED | OUTPATIENT
Start: 2018-05-12 | End: 2018-05-14

## 2018-05-12 RX ORDER — POTASSIUM CHLORIDE 20 MEQ
20 PACKET (EA) ORAL DAILY
Qty: 0 | Refills: 0 | Status: DISCONTINUED | OUTPATIENT
Start: 2018-05-12 | End: 2018-05-14

## 2018-05-12 RX ORDER — DIVALPROEX SODIUM 500 MG/1
250 TABLET, DELAYED RELEASE ORAL
Qty: 0 | Refills: 0 | Status: DISCONTINUED | OUTPATIENT
Start: 2018-05-12 | End: 2018-05-13

## 2018-05-12 RX ORDER — FENTANYL CITRATE 50 UG/ML
25 INJECTION INTRAVENOUS
Qty: 0 | Refills: 0 | Status: DISCONTINUED | OUTPATIENT
Start: 2018-05-12 | End: 2018-05-12

## 2018-05-12 RX ORDER — OXYCODONE HYDROCHLORIDE 5 MG/1
5 TABLET ORAL EVERY 4 HOURS
Qty: 0 | Refills: 0 | Status: DISCONTINUED | OUTPATIENT
Start: 2018-05-12 | End: 2018-05-12

## 2018-05-12 RX ORDER — QUETIAPINE FUMARATE 200 MG/1
37.5 TABLET, FILM COATED ORAL
Qty: 0 | Refills: 0 | Status: DISCONTINUED | OUTPATIENT
Start: 2018-05-12 | End: 2018-05-14

## 2018-05-12 RX ORDER — SODIUM CHLORIDE 9 MG/ML
1000 INJECTION INTRAMUSCULAR; INTRAVENOUS; SUBCUTANEOUS
Qty: 0 | Refills: 0 | Status: DISCONTINUED | OUTPATIENT
Start: 2018-05-12 | End: 2018-05-12

## 2018-05-12 RX ORDER — TRAMADOL HYDROCHLORIDE 50 MG/1
50 TABLET ORAL EVERY 6 HOURS
Qty: 0 | Refills: 0 | Status: DISCONTINUED | OUTPATIENT
Start: 2018-05-12 | End: 2018-05-12

## 2018-05-12 RX ORDER — ACETAMINOPHEN 500 MG
1000 TABLET ORAL ONCE
Qty: 0 | Refills: 0 | Status: COMPLETED | OUTPATIENT
Start: 2018-05-12 | End: 2018-05-12

## 2018-05-12 RX ORDER — DOCUSATE SODIUM 100 MG
100 CAPSULE ORAL THREE TIMES A DAY
Qty: 0 | Refills: 0 | Status: DISCONTINUED | OUTPATIENT
Start: 2018-05-12 | End: 2018-05-14

## 2018-05-12 RX ORDER — MORPHINE SULFATE 50 MG/1
2 CAPSULE, EXTENDED RELEASE ORAL EVERY 4 HOURS
Qty: 0 | Refills: 0 | Status: DISCONTINUED | OUTPATIENT
Start: 2018-05-12 | End: 2018-05-12

## 2018-05-12 RX ORDER — DONEPEZIL HYDROCHLORIDE 10 MG/1
10 TABLET, FILM COATED ORAL AT BEDTIME
Qty: 0 | Refills: 0 | Status: DISCONTINUED | OUTPATIENT
Start: 2018-05-12 | End: 2018-05-14

## 2018-05-12 RX ORDER — ONDANSETRON 8 MG/1
4 TABLET, FILM COATED ORAL EVERY 6 HOURS
Qty: 0 | Refills: 0 | Status: DISCONTINUED | OUTPATIENT
Start: 2018-05-12 | End: 2018-05-14

## 2018-05-12 RX ORDER — ACETAMINOPHEN 500 MG
650 TABLET ORAL EVERY 6 HOURS
Qty: 0 | Refills: 0 | Status: DISCONTINUED | OUTPATIENT
Start: 2018-05-12 | End: 2018-05-14

## 2018-05-12 RX ORDER — QUETIAPINE FUMARATE 200 MG/1
75 TABLET, FILM COATED ORAL
Qty: 0 | Refills: 0 | Status: DISCONTINUED | OUTPATIENT
Start: 2018-05-12 | End: 2018-05-14

## 2018-05-12 RX ORDER — ONDANSETRON 8 MG/1
4 TABLET, FILM COATED ORAL ONCE
Qty: 0 | Refills: 0 | Status: DISCONTINUED | OUTPATIENT
Start: 2018-05-12 | End: 2018-05-12

## 2018-05-12 RX ORDER — CEFAZOLIN SODIUM 1 G
2000 VIAL (EA) INJECTION EVERY 8 HOURS
Qty: 0 | Refills: 0 | Status: COMPLETED | OUTPATIENT
Start: 2018-05-12 | End: 2018-05-13

## 2018-05-12 RX ORDER — FENTANYL CITRATE 50 UG/ML
50 INJECTION INTRAVENOUS
Qty: 0 | Refills: 0 | Status: DISCONTINUED | OUTPATIENT
Start: 2018-05-12 | End: 2018-05-12

## 2018-05-12 RX ORDER — TRAMADOL HYDROCHLORIDE 50 MG/1
25 TABLET ORAL EVERY 4 HOURS
Qty: 0 | Refills: 0 | Status: DISCONTINUED | OUTPATIENT
Start: 2018-05-12 | End: 2018-05-14

## 2018-05-12 RX ORDER — WARFARIN SODIUM 2.5 MG/1
5 TABLET ORAL ONCE
Qty: 0 | Refills: 0 | Status: COMPLETED | OUTPATIENT
Start: 2018-05-12 | End: 2018-05-12

## 2018-05-12 RX ORDER — TRAMADOL HYDROCHLORIDE 50 MG/1
50 TABLET ORAL EVERY 4 HOURS
Qty: 0 | Refills: 0 | Status: DISCONTINUED | OUTPATIENT
Start: 2018-05-12 | End: 2018-05-14

## 2018-05-12 RX ADMIN — Medication 100 MILLIGRAM(S): at 16:57

## 2018-05-12 RX ADMIN — Medication 100 MILLIGRAM(S): at 13:25

## 2018-05-12 RX ADMIN — Medication 20 MILLIEQUIVALENT(S): at 12:04

## 2018-05-12 RX ADMIN — MORPHINE SULFATE 4 MILLIGRAM(S): 50 CAPSULE, EXTENDED RELEASE ORAL at 06:33

## 2018-05-12 RX ADMIN — WARFARIN SODIUM 5 MILLIGRAM(S): 2.5 TABLET ORAL at 21:56

## 2018-05-12 RX ADMIN — SODIUM CHLORIDE 100 MILLILITER(S): 9 INJECTION INTRAMUSCULAR; INTRAVENOUS; SUBCUTANEOUS at 02:16

## 2018-05-12 RX ADMIN — Medication 1 TABLET(S): at 12:06

## 2018-05-12 RX ADMIN — DIVALPROEX SODIUM 250 MILLIGRAM(S): 500 TABLET, DELAYED RELEASE ORAL at 16:57

## 2018-05-12 RX ADMIN — QUETIAPINE FUMARATE 75 MILLIGRAM(S): 200 TABLET, FILM COATED ORAL at 06:32

## 2018-05-12 RX ADMIN — DIVALPROEX SODIUM 250 MILLIGRAM(S): 500 TABLET, DELAYED RELEASE ORAL at 06:32

## 2018-05-12 RX ADMIN — Medication 400 MILLIGRAM(S): at 10:32

## 2018-05-12 RX ADMIN — Medication 100 MILLIGRAM(S): at 21:56

## 2018-05-12 RX ADMIN — QUETIAPINE FUMARATE 75 MILLIGRAM(S): 200 TABLET, FILM COATED ORAL at 19:57

## 2018-05-12 RX ADMIN — Medication 1000 MILLIGRAM(S): at 10:42

## 2018-05-12 RX ADMIN — DONEPEZIL HYDROCHLORIDE 10 MILLIGRAM(S): 10 TABLET, FILM COATED ORAL at 21:56

## 2018-05-12 RX ADMIN — TRAMADOL HYDROCHLORIDE 50 MILLIGRAM(S): 50 TABLET ORAL at 18:30

## 2018-05-12 RX ADMIN — QUETIAPINE FUMARATE 37.5 MILLIGRAM(S): 200 TABLET, FILM COATED ORAL at 13:25

## 2018-05-12 NOTE — PROGRESS NOTE ADULT - SUBJECTIVE AND OBJECTIVE BOX
Pt S/E at bedside, no acute events overnight, pain controlled. Plan for OR this AM for L hip IMN.    Vital Signs Last 24 Hrs  T(C): 36.8 (12 May 2018 06:31), Max: 36.8 (11 May 2018 20:30)  T(F): 98.2 (12 May 2018 06:31), Max: 98.3 (11 May 2018 20:30)  HR: 97 (12 May 2018 06:31) (77 - 97)  BP: 136/83 (12 May 2018 06:31) (95/51 - 148/85)  BP(mean): --  RR: 14 (12 May 2018 06:31) (14 - 17)  SpO2: 99% (12 May 2018 06:31) (94% - 100%)    Gen: NAD, AAOx3    Left Lower Extremity:  Skin intact, no ecchymosis  Short, ER extremity  +EHL/FHL/TA/GS  SILT L3-S1  +DP/PT Pulses  Compartments soft  No calf TTP B/L     LUE: Skin intact, moderate swelling to L shoulder, +ttp over shoulder, no ttp elsewhere, +AIN/PIN/M/R/U/Msc/Ax nerves intact, SILT C5-T1, +radial pulse, compartments soft/compressible, full painless ROM at elbow/wrist

## 2018-05-12 NOTE — PROGRESS NOTE ADULT - SUBJECTIVE AND OBJECTIVE BOX
Ortho Post Op Check    Pt S/E at bedside, tolerated procedure well, pain controlled. No complaints, resting comfortably.    Vital Signs Last 24 Hrs  T(C): 36.3 (12 May 2018 11:05), Max: 36.9 (12 May 2018 11:00)  T(F): 97.4 (12 May 2018 11:05), Max: 98.4 (12 May 2018 11:00)  HR: 88 (12 May 2018 11:05) (81 - 97)  BP: 131/98 (12 May 2018 11:05) (95/51 - 144/94)  BP(mean): --  RR: 16 (12 May 2018 11:05) (14 - 16)  SpO2: 100% (12 May 2018 11:05) (99% - 100%)    Gen: NAD, awake/alert, confused    Left Lower Extremity:  Dressing clean dry intact  +EHL/FHL/TA/GS  SILT L3-S1  +DP/PT Pulses  Compartments soft  No calf TTP B/L     LUE: Skin intact, moderate swelling to L shoulder, +ttp over shoulder, no ttp elsewhere, +AIN/PIN/M/R/U/Msc/Ax nerves intact, SILT C5-T1, +radial pulse, compartments soft/compressible, full painless ROM at elbow/wrist

## 2018-05-12 NOTE — DISCHARGE NOTE ADULT - MEDICATION SUMMARY - MEDICATIONS TO STOP TAKING
I will STOP taking the medications listed below when I get home from the hospital:    furosemide 20 mg oral tablet  -- 1 tab(s) by mouth once a day    potassium chloride 20 mEq oral tablet, extended release  -- 1 tab(s) by mouth once a day    furosemide 20 mg oral tablet  -- 1 tab(s) by mouth once a day

## 2018-05-12 NOTE — DISCHARGE NOTE ADULT - HOSPITAL COURSE
Orthopedic Summary  H&P:  Pt is a 86y Male PAST MEDICAL & SURGICAL HISTORY:  Dementia without behavioral disturbance, unspecified dementia type  Afib  No significant past surgical history        Now s/p Hip IM Nail for fracture. Pt is afebrile with stable vital signs. Pain is controlled. Exam reveals intact EHL FHL TA GS, +DP. Dressing is clean and dry with a New Aquacel bandage on.  Vital Signs Last 24 Hrs  T(C): 36.3 (05-12-18 @ 11:05), Max: 36.9 (05-12-18 @ 11:00)  T(F): 97.4 (05-12-18 @ 11:05), Max: 98.4 (05-12-18 @ 11:00)  HR: 88 (05-12-18 @ 11:05) (81 - 97)  BP: 131/98 (05-12-18 @ 11:05) (95/51 - 144/94)  BP(mean): --  RR: 16 (05-12-18 @ 11:05) (14 - 16)  SpO2: 100% (05-12-18 @ 11:05) (99% - 100%)                        10.3   17.90 )-----------( 148      ( 12 May 2018 12:17 )             30.8     PT/INR - ( 12 May 2018 03:34 )   PT: 18.9 sec;   INR: 1.73 ratio         PTT - ( 12 May 2018 03:34 )  PTT:31.3 sec    Hospital Course:  Patient presented to [facility] ED after a fall, found to have an intertrochanteric hip fracture. Pt was  medically/cardiac cleared prior to the surgical procedure. Prophylactic antibiotics were started before the procedure and continued for 24 hours. They were admitted after surgery to the orthopedic floor.  **Post op required **unit PRBC transfusion for acute blood loss anemia.  There were no complications during the hospital stay. All home medications were continued.    Routine consults were obtained from the Anticoagulation Team for DVT/PE prophylaxis, from Physical Therapy for twice daily PT starting on POD 0 or 1, and follwed by Medicine for Co-management. Patient was placed on coumadin for anticoagulation.  Pertinent home medications were continued.  Daily labs were followed.      On POD 1 the pt was OOB with PT and there were no overnight events. POD1 PT was started, and on POD 2 a new Aquacel dressing was applied. Orthopedically, the pt will likely be ready POD2 for DC to Rehab for ongoing PT, once evaluated and ok per Medicine.  The orthopedic Attending is aware and agrees. See addendum to DC summary per medical team below for any additional info or if any changes. Orthopedic Summary  H&P:  Pt is a 86y Male PAST MEDICAL & SURGICAL HISTORY:  Dementia without behavioral disturbance, unspecified dementia type  Afib  No significant past surgical history        Now s/p LEFT Hip IM Nail for fracture. Pt is afebrile with stable vital signs. Pain is controlled. Exam reveals intact EHL FHL TA GS, +DP. Dressing is clean and dry with a New Aquacel bandage on.  Vital Signs Last 24 Hrs  T(C): 36.3 (05-12-18 @ 11:05), Max: 36.9 (05-12-18 @ 11:00)  T(F): 97.4 (05-12-18 @ 11:05), Max: 98.4 (05-12-18 @ 11:00)  HR: 88 (05-12-18 @ 11:05) (81 - 97)  BP: 131/98 (05-12-18 @ 11:05) (95/51 - 144/94)  BP(mean): --  RR: 16 (05-12-18 @ 11:05) (14 - 16)  SpO2: 100% (05-12-18 @ 11:05) (99% - 100%)                        10.3   17.90 )-----------( 148      ( 12 May 2018 12:17 )             30.8     PT/INR - ( 12 May 2018 03:34 )   PT: 18.9 sec;   INR: 1.73 ratio         PTT - ( 12 May 2018 03:34 )  PTT:31.3 sec    Hospital Course:  Patient presented to [facility] ED after a fall, found to have an intertrochanteric hip fracture and a Left proximal humerus fx (treated conservatively in a sling) Pt was  medically cleared prior to the surgical procedure. Prophylactic antibiotics were started before the procedure and continued for 24 hours. They were admitted after surgery to the orthopedic floor.  There were no complications during the hospital stay. All home medications were continued.    Routine consults were obtained from the Anticoagulation Team for DVT/PE prophylaxis, from Physical Therapy for twice daily PT starting on POD 0 or 1, and followed by Medicine for Co-management. Patient was placed on coumadin for anticoagulation.  Pertinent home medications were continued.  Daily labs were followed.      On POD 1 the pt was OOB with PT and there were no overnight events. POD1 PT was started, and on POD 2 a new Aquacel dressing was applied. Orthopedically, the pt will likely be ready POD2 for DC to Rehab for ongoing PT, once evaluated and ok per Medicine.  The orthopedic Attending is aware and agrees. See addendum to DC summary per medical team below for any additional info or if any changes.

## 2018-05-12 NOTE — PROGRESS NOTE ADULT - ASSESSMENT
86M with L proximal humerus fx and L hip fx  Pain control  NWB LLE, bedrest  NWB LUE in sling  NPO  IVF while NPO  Hold chemical DVT ppx  Plan for OR this morning for L hip IMN

## 2018-05-12 NOTE — BRIEF OPERATIVE NOTE - PROCEDURE
<<-----Click on this checkbox to enter Procedure Antegrade intramedullary nailing of femur  05/12/2018    Active  [id number]

## 2018-05-12 NOTE — DISCHARGE NOTE ADULT - PLAN OF CARE
Return to baseline ADLs IM Nail DC Instructions:    1.	Resume previous diet, regular or diabetic as appropriate  2.	Weight Bearing as Tolerated Left lower extremity with assistance and rolling walker.                Non weight bearing left upper extremity in sling  3.	Continue DVT/PE Prophylaxis. Coumadin. See Med Rec for Duration and dose.  4.	PT daily  5.	Follow up with Orthopedic Surgeon Dr. Ambrocio in 10-14 Days after Discharge from the Hospital. Call Office asap For Appointment.  6.	Staples to be removed Post-Op Day 14, provided wound is healed, no open areas or copious drainage..  7.	Ice the hip as much as possible  8.	Keep Aquacel bandage on Hip. Change only if wet or soiled using Tegaderm/Paper tape and dry gauze.  9.                OK to shower with Aquacel beige bandage, otherwise sponge bathe only. Will need assistance to shower. IM Nail DC Instructions:    1.	Resume previous diet, regular or diabetic as appropriate  2.	Weight Bearing as Tolerated with assistance and rolling walker  3.	Continue DVT/PE Prophylaxis. Coumadin. See Med Rec and AC Instructions for dose.  4.	PT daily  5.	Follow up with Orthopedic Surgeon NIALL SYLVESTER in 14 Days. Call Office asap For Appointment.  6.	Staples to be removed by RN Post-Op Day 14 (5/26), provided wound is healed, no open areas or copious drainage.  7.	Ice the hip as much as possible  8.	Keep Aquacel bandage on Hip. Change only if wet or soiled using Tegaderm/Paper tape and dry gauze. No bandage needed after staple removal.  9.     OK to shower with Aquacel beige bandage, otherwise sponge bathe only. Will need assistance to shower. monitor INR Once INR 2.0-3.0 can discontinue heparin SQ

## 2018-05-12 NOTE — DISCHARGE NOTE ADULT - CARE PROVIDER_API CALL
Darrell Ambrocio), Orthopaedic Surgery  [address] [facility]  [address] [place] [address]  Phone: [phone]  Fax: [phone]

## 2018-05-12 NOTE — DISCHARGE NOTE ADULT - PATIENT PORTAL LINK FT
You can access the [facility] Patient Portal, offered by [facility], by registering with the following website: [email or web address]

## 2018-05-12 NOTE — PROGRESS NOTE ADULT - SUBJECTIVE AND OBJECTIVE BOX
CC- s/p mechanical fall    HPI:  85 y/o mainly wheelchair bound male with h/o afib on vka and dementia presents after fall out of his wheelchair at home today.  history provided by wife as patient has baseline dementia.  states that he was leaning over out of chair and fell on his left side.  unable to ambulate, c/o severe pain.  EMS was called.  no LOC, head trauma.  wife and home aide witnessed fall.  In ED, noted to have left intertrochanteric fx, s/p dilaudid for pain. (11 May 2018 17:21)    18- s/p IMN LT hip today  Review of system- All 10 systems reviewed and is as per HPI otherwise negative.     T(C): 36.3 (18 @ 11:05), Max: 36.9 (18 @ 11:00)  HR: 88 (18 @ 11:05) (81 - 97)  BP: 131/98 (18 @ 11:05) (95/51 - 144/94)  RR: 16 (18 @ 11:05) (14 - 16)  SpO2: 100% (18 @ 11:05) (99% - 100%)  Wt(kg): --    LABS:                        10.3   17.90 )-----------( 148      ( 12 May 2018 12:17 )             30.8     136  |  104  |  22  ----------------------------<  126<H>  4.4   |  24  |  0.97  Ca    8.5      12 May 2018 12:17  TPro  7.8  /  Alb  3.9  /  TBili  0.7  /  DBili  x   /  AST  23  /  ALT  18  /  AlkPhos  120  05-11  PT/INR - ( 12 May 2018 03:34 )   PT: 18.9 sec;   INR: 1.73 ratio    PTT - ( 12 May 2018 03:34 )  PTT:31.3 sec    Urinalysis Basic - ( 11 May 2018 17:03 )  Color: Yellow / Appearance: Clear / S.015 / pH: x  Gluc: x / Ketone: Trace  / Bili: Negative / Urobili: Negative mg/dL   Blood: x / Protein: Negative mg/dL / Nitrite: Negative   Leuk Esterase: Trace / RBC: Negative /HPF / WBC 0-2   Sq Epi: x / Non Sq Epi: x / Bacteria: Occasional    CARDIAC MARKERS ( 11 May 2018 13:04 )  0.018 ng/mL / x     / 94 U/L / x     / x        RADIOLOGY & ADDITIONAL TESTS:    PHYSICAL EXAM:  GENERAL: NAD, well-groomed, well-developed  HEAD:  Atraumatic, Normocephalic  EYES: EOMI, PERRLA, conjunctiva and sclera clear  HEENT: Moist mucous membranes  NECK: Supple, No JVD  NERVOUS SYSTEM:  Awake, confused  CHEST/LUNG: Clear to auscultation bilaterally; No rales, rhonchi, wheezing, or rubs  HEART: Regular rate and rhythm; No murmurs, rubs, or gallops  ABDOMEN: Soft, Nontender, Nondistended; Bowel sounds present  GENITOURINARY- Voiding, no palpable bladder  EXTREMITIES:  2+ Peripheral Pulses, No clubbing, cyanosis, or edema  MUSCULOSKELTAL- LT hip dressing dry  SKIN-no rash, no lesion    MEDICATIONS  (STANDING):  ceFAZolin   IVPB 2000 milliGRAM(s) IV Intermittent every 8 hours  diVALproex  milliGRAM(s) Oral two times a day  docusate sodium 100 milliGRAM(s) Oral three times a day  donepezil 10 milliGRAM(s) Oral at bedtime  lactated ringers. 1000 milliLiter(s) (100 mL/Hr) IV Continuous <Continuous>  multivitamin 1 Tablet(s) Oral daily  potassium chloride    Tablet ER 20 milliEquivalent(s) Oral daily  QUEtiapine 37.5 milliGRAM(s) Oral <User Schedule>  QUEtiapine 75 milliGRAM(s) Oral <User Schedule>  warfarin 5 milliGRAM(s) Oral once    MEDICATIONS  (PRN):  acetaminophen   Tablet 650 milliGRAM(s) Oral every 6 hours PRN For Temp greater than 38 C (100.4 F)  acetaminophen   Tablet. 650 milliGRAM(s) Oral every 6 hours PRN Headache  morphine  - Injectable 2 milliGRAM(s) IV Push every 4 hours PRN Severe Pain (7 - 10)  ondansetron Injectable 4 milliGRAM(s) IV Push every 6 hours PRN Nausea and/or Vomiting  traMADol 25 milliGRAM(s) Oral every 4 hours PRN Moderate Pain (4 - 6)  traMADol 50 milliGRAM(s) Oral every 4 hours PRN Severe Pain (7 - 10)    Assessment/Plan  #S/p mechanical fall with LT hip fracture s/p IMN  Ortho f/u appreciated  PT as tolerated  AC by coumadin  Monitor   Pain meds prn  Bowel regimen  Incentive spirometry    #Afib chronic  Resume AC by coumadin  Daily INR    #dementia,  -cont aricept   -cont seroquel, depakote    #Dispo- PT, likely DAY on Monday

## 2018-05-12 NOTE — PROGRESS NOTE ADULT - ASSESSMENT
86M s/p L hip IMN POD 0  Analgesia  DVT ppx  WBAT LLE  NWB LUE in sling  PT/OT  Incentive spirometry  Cont management per primary team  DC planning

## 2018-05-12 NOTE — DISCHARGE NOTE ADULT - CARE PLAN
Principal Discharge DX:	Hip fracture  Goal:	Return to baseline ADLs  Assessment and plan of treatment:	IM Nail DC Instructions:    1.	Resume previous diet, regular or diabetic as appropriate  2.	Weight Bearing as Tolerated Left lower extremity with assistance and rolling walker.                Non weight bearing left upper extremity in sling  3.	Continue DVT/PE Prophylaxis. Coumadin. See Med Rec for Duration and dose.  4.	PT daily  5.	Follow up with Orthopedic Surgeon Dr. Ambrocio in 10-14 Days after Discharge from the Hospital. Call Office asap For Appointment.  6.	Staples to be removed Post-Op Day 14, provided wound is healed, no open areas or copious drainage..  7.	Ice the hip as much as possible  8.	Keep Aquacel bandage on Hip. Change only if wet or soiled using Tegaderm/Paper tape and dry gauze.  9.                OK to shower with Aquacel beige bandage, otherwise sponge bathe only. Will need assistance to shower. Principal Discharge DX:	Hip fracture  Goal:	Return to baseline ADLs  Assessment and plan of treatment:	IM Nail DC Instructions:    1.	Resume previous diet, regular or diabetic as appropriate  2.	Weight Bearing as Tolerated with assistance and rolling walker  3.	Continue DVT/PE Prophylaxis. Coumadin. See Med Rec and AC Instructions for dose.  4.	PT daily  5.	Follow up with Orthopedic Surgeon NIALL SYLVESTER in 14 Days. Call Office asap For Appointment.  6.	Staples to be removed by RN Post-Op Day 14 (5/26), provided wound is healed, no open areas or copious drainage.  7.	Ice the hip as much as possible  8.	Keep Aquacel bandage on Hip. Change only if wet or soiled using Tegaderm/Paper tape and dry gauze. No bandage needed after staple removal.  9.     OK to shower with Aquacel beige bandage, otherwise sponge bathe only. Will need assistance to shower. Principal Discharge DX:	Hip fracture  Goal:	Return to baseline ADLs  Assessment and plan of treatment:	IM Nail DC Instructions:    1.	Resume previous diet, regular or diabetic as appropriate  2.	Weight Bearing as Tolerated with assistance and rolling walker  3.	Continue DVT/PE Prophylaxis. Coumadin. See Med Rec and AC Instructions for dose.  4.	PT daily  5.	Follow up with Orthopedic Surgeon NIALL SYLVESTER in 14 Days. Call Office asap For Appointment.  6.	Staples to be removed by RN Post-Op Day 14 (5/26), provided wound is healed, no open areas or copious drainage.  7.	Ice the hip as much as possible  8.	Keep Aquacel bandage on Hip. Change only if wet or soiled using Tegaderm/Paper tape and dry gauze. No bandage needed after staple removal.  9.     OK to shower with Aquacel beige bandage, otherwise sponge bathe only. Will need assistance to shower.  Secondary Diagnosis:	Chronic atrial fibrillation  Goal:	monitor INR  Assessment and plan of treatment:	Once INR 2.0-3.0 can discontinue heparin SQ

## 2018-05-13 LAB
ANION GAP SERPL CALC-SCNC: 8 MMOL/L — SIGNIFICANT CHANGE UP (ref 5–17)
BUN SERPL-MCNC: 23 MG/DL — SIGNIFICANT CHANGE UP (ref 7–23)
CALCIUM SERPL-MCNC: 8.4 MG/DL — LOW (ref 8.5–10.1)
CHLORIDE SERPL-SCNC: 102 MMOL/L — SIGNIFICANT CHANGE UP (ref 96–108)
CO2 SERPL-SCNC: 26 MMOL/L — SIGNIFICANT CHANGE UP (ref 22–31)
CREAT SERPL-MCNC: 0.94 MG/DL — SIGNIFICANT CHANGE UP (ref 0.5–1.3)
GLUCOSE SERPL-MCNC: 104 MG/DL — HIGH (ref 70–99)
HCT VFR BLD CALC: 28.2 % — LOW (ref 39–50)
HGB BLD-MCNC: 9.4 G/DL — LOW (ref 13–17)
INR BLD: 1.03 RATIO — SIGNIFICANT CHANGE UP (ref 0.88–1.16)
MCHC RBC-ENTMCNC: 31.9 PG — SIGNIFICANT CHANGE UP (ref 27–34)
MCHC RBC-ENTMCNC: 33.3 GM/DL — SIGNIFICANT CHANGE UP (ref 32–36)
MCV RBC AUTO: 95.6 FL — SIGNIFICANT CHANGE UP (ref 80–100)
NRBC # BLD: 0 /100 WBCS — SIGNIFICANT CHANGE UP (ref 0–0)
PLATELET # BLD AUTO: 144 K/UL — LOW (ref 150–400)
POTASSIUM SERPL-MCNC: 4.6 MMOL/L — SIGNIFICANT CHANGE UP (ref 3.5–5.3)
POTASSIUM SERPL-SCNC: 4.6 MMOL/L — SIGNIFICANT CHANGE UP (ref 3.5–5.3)
PROTHROM AB SERPL-ACNC: 11.1 SEC — SIGNIFICANT CHANGE UP (ref 9.8–12.7)
RBC # BLD: 2.95 M/UL — LOW (ref 4.2–5.8)
RBC # FLD: 12.8 % — SIGNIFICANT CHANGE UP (ref 10.3–14.5)
SODIUM SERPL-SCNC: 136 MMOL/L — SIGNIFICANT CHANGE UP (ref 135–145)
WBC # BLD: 15.73 K/UL — HIGH (ref 3.8–10.5)
WBC # FLD AUTO: 15.73 K/UL — HIGH (ref 3.8–10.5)

## 2018-05-13 PROCEDURE — 99223 1ST HOSP IP/OBS HIGH 75: CPT

## 2018-05-13 RX ORDER — WARFARIN SODIUM 2.5 MG/1
6 TABLET ORAL ONCE
Qty: 0 | Refills: 0 | Status: COMPLETED | OUTPATIENT
Start: 2018-05-13 | End: 2018-05-13

## 2018-05-13 RX ORDER — HEPARIN SODIUM 5000 [USP'U]/ML
5000 INJECTION INTRAVENOUS; SUBCUTANEOUS EVERY 8 HOURS
Qty: 0 | Refills: 0 | Status: DISCONTINUED | OUTPATIENT
Start: 2018-05-13 | End: 2018-05-14

## 2018-05-13 RX ORDER — ENOXAPARIN SODIUM 100 MG/ML
40 INJECTION SUBCUTANEOUS DAILY
Qty: 0 | Refills: 0 | Status: DISCONTINUED | OUTPATIENT
Start: 2018-05-13 | End: 2018-05-13

## 2018-05-13 RX ORDER — DIVALPROEX SODIUM 500 MG/1
250 TABLET, DELAYED RELEASE ORAL
Qty: 0 | Refills: 0 | Status: DISCONTINUED | OUTPATIENT
Start: 2018-05-13 | End: 2018-05-14

## 2018-05-13 RX ADMIN — TRAMADOL HYDROCHLORIDE 50 MILLIGRAM(S): 50 TABLET ORAL at 11:00

## 2018-05-13 RX ADMIN — QUETIAPINE FUMARATE 75 MILLIGRAM(S): 200 TABLET, FILM COATED ORAL at 05:57

## 2018-05-13 RX ADMIN — Medication 1 TABLET(S): at 11:00

## 2018-05-13 RX ADMIN — TRAMADOL HYDROCHLORIDE 50 MILLIGRAM(S): 50 TABLET ORAL at 11:31

## 2018-05-13 RX ADMIN — SODIUM CHLORIDE 100 MILLILITER(S): 9 INJECTION, SOLUTION INTRAVENOUS at 05:58

## 2018-05-13 RX ADMIN — QUETIAPINE FUMARATE 37.5 MILLIGRAM(S): 200 TABLET, FILM COATED ORAL at 13:02

## 2018-05-13 RX ADMIN — HEPARIN SODIUM 5000 UNIT(S): 5000 INJECTION INTRAVENOUS; SUBCUTANEOUS at 21:18

## 2018-05-13 RX ADMIN — DONEPEZIL HYDROCHLORIDE 10 MILLIGRAM(S): 10 TABLET, FILM COATED ORAL at 21:18

## 2018-05-13 RX ADMIN — Medication 100 MILLIGRAM(S): at 00:49

## 2018-05-13 RX ADMIN — DIVALPROEX SODIUM 250 MILLIGRAM(S): 500 TABLET, DELAYED RELEASE ORAL at 17:17

## 2018-05-13 RX ADMIN — Medication 20 MILLIEQUIVALENT(S): at 11:00

## 2018-05-13 RX ADMIN — QUETIAPINE FUMARATE 75 MILLIGRAM(S): 200 TABLET, FILM COATED ORAL at 20:02

## 2018-05-13 RX ADMIN — HEPARIN SODIUM 5000 UNIT(S): 5000 INJECTION INTRAVENOUS; SUBCUTANEOUS at 13:02

## 2018-05-13 RX ADMIN — WARFARIN SODIUM 6 MILLIGRAM(S): 2.5 TABLET ORAL at 21:18

## 2018-05-13 RX ADMIN — DIVALPROEX SODIUM 250 MILLIGRAM(S): 500 TABLET, DELAYED RELEASE ORAL at 05:57

## 2018-05-13 NOTE — PROGRESS NOTE ADULT - SUBJECTIVE AND OBJECTIVE BOX
CC- s/p mechanical fall    HPI:  87 y/o mainly wheelchair bound male with h/o afib on vka and dementia presents after fall out of his wheelchair at home today.  history provided by wife as patient has baseline dementia.  states that he was leaning over out of chair and fell on his left side.  unable to ambulate, c/o severe pain.  EMS was called.  no LOC, head trauma.  wife and home aide witnessed fall.  In ED, noted to have left intertrochanteric fx, s/p dilaudid for pain. (11 May 2018 17:21)    18- s/p IMN LT hip today  18 confused    Review of system- All 10 systems reviewed and is as per HPI otherwise negative.     Vital Signs Last 24 Hrs  T(C): 36.8 (13 May 2018 11:17), Max: 36.8 (13 May 2018 11:17)  T(F): 98.2 (13 May 2018 11:17), Max: 98.2 (13 May 2018 11:17)  HR: 94 (13 May 2018 11:17) (94 - 100)  BP: 137/64 (13 May 2018 11:17) (104/63 - 147/79)  BP(mean): --  RR: 18 (13 May 2018 11:17) (16 - 18)  SpO2: 97% (13 May 2018 11:17) (96% - 100%)    LABS:                                 9.4    15.73 )-----------( 144      ( 13 May 2018 05:47 )             28.2     136    |  102    |  23     ----------------------------<  104    4.6     |  26     |  0.94     Ca    8.4        13 May 2018 05:47  TPro  7.8    /  Alb  3.9    /  TBili  0.7    /  DBili  x      /  AST  23     /  ALT  18     /  AlkPhos  120    11 May 2018 13:04  LIVER FUNCTIONS - ( 11 May 2018 13:04 )  Alb: 3.9 g/dL / Pro: 7.8 gm/dL / ALK PHOS: 120 U/L / ALT: 18 U/L / AST: 23 U/L / GGT: x         PT/INR - ( 13 May 2018 05:47 )   PT: 11.1 sec;   INR: 1.03 ratio     PTT - ( 12 May 2018 03:34 )  PTT:31.3 sec    CARDIAC MARKERS ( 11 May 2018 13:04 )  0.018 ng/mL / x     / 94 U/L / x     / x        Urinalysis Basic - ( 11 May 2018 17:03 )  Color: Yellow / Appearance: Clear / S.015 / pH: x  Gluc: x / Ketone: Trace  / Bili: Negative / Urobili: Negative mg/dL   Blood: x / Protein: Negative mg/dL / Nitrite: Negative   Leuk Esterase: Trace / RBC: Negative /HPF / WBC 0-2   Sq Epi: x / Non Sq Epi: x / Bacteria: Occasional    RADIOLOGY & ADDITIONAL TESTS:  EXAM:  CT ABDOMEN AND PELVIS                        EXAM:  CT CHEST                        *** ADDENDUM 2018  ***    Images were reviewed at the request of Dr. Meza. There is a impacted   subcapital fracture of the left humerus. Results were discussed with Dr. Meza at 11:45 AM on 2018  *** END OF ADDENDUM 2018  ***  PROCEDURE DATE:  2018      INTERPRETATION:  CT CHEST, CT ABDOMEN AND PELVIS    HISTORY:  fall with left hip pain, taking blood thinners    Technique: CT of the chest, abdomen, and pelvis is performed without oral   or intravenous contrast. Axial images are supplemented with coronal and   sagittal reformations. This study was performed using automatic exposure   control (radiation dose reduction software) to obtain a diagnostic image   quality scan with patient dose as low as reasonably achievable.    Contrast:     None    Comparison: Chest CT 2016, CT abdomen and pelvis 2008    Findings:    LUNGS, AIRWAYS: The central airways are patent. The lungs are clear.  PLEURA: No pleural effusion, hemothorax, or pneumothorax.  HEART AND VESSELS: Normal heart size. No pericardial effusion. Coronary   artery calcifications are present. Normal caliber thoracic aorta.  MEDIASTINUMAND TAMANNA: No adenopathy or hematoma.  CHEST WALL: No hematoma.    LIVER: Normal.  SPLEEN: Normal.  PANCREAS: Diffuse atrophy.  GALLBLADDER/BILIARY TREE: Nondilated. Normal gallbladder.  ADRENALS: Normal.  KIDNEYS: Punctate right renal calculus. Left duplicated collecting system.  LYMPHADENOPATHY/RETROPERITONEUM: No adenopathy or hematoma.  VASCULATURE: Aortoiliac atherosclerosis without aneurysm.  BOWEL: No bowel-related abnormality. Small hiatal hernia  PELVIC VISCERA: Prostate radiation fiducials.  PELVIC LYMPH NODES: No pelvic adenopathy or hematoma.  PERITONEUM/ABDOMINAL WALL: No free air, ascites, or hemoperitoneum.  SKELETAL: Comminuted left intertrochanteric fracture with varus   angulation. Status post L4 kyphoplasty.    IMPRESSION:   Comminuted left intertrochanteric fracture with varus angulation. No   surrounding hematoma.  No acute traumatic injury in the chest, abdomen, or pelvis.  ***Please see the addendum at the top of this report. It may contain   additional important information or changes.****    EXAM:  SHOULDER COMP  MIN 2 VIEWS-LT                        *** ADDENDUM 2018  ***  Images reviewed at the request of Dr. Meza. There is a subcapital   fracture of the humerus with mild impaction. Images were reviewed and   results were discussed with Dr. Meza at 11:45 AM on May 12, 2018.  *** END OF ADDENDUM 2018  ***    PROCEDURE DATE:  2018    INTERPRETATION:    Radiographs of the LEFT shoulder      CLINICAL INFORMATION:  Fall  Pain.  TECHNIQUE:  Frontal views in internal and external rotation of the   shoulder were obtained.  A Y-view was also obtained.       FINDINGS:   No prior examinations are available for review.  The osseous structures of the shoulder are intact.   No fracture is seen.    No destructive bone lesion is identified.  The glenohumeral joint is located and intact.  The acromioclavicular   joint appears aligned and intact.  No pathologic calcifications or other soft tissue abnormalities are seen.    The visualized chest wall and ribs are intact.  No radiopaque foreign   body is identified.     IMPRESSION:   No definite fracture. If symptoms persist, CT scan is   recommended.          ***Please see the addendum at the top of this report. It may contain   additional important information or changes.****    PHYSICAL EXAM:  GENERAL: NAD, well-groomed, well-developed  HEAD:  Atraumatic, Normocephalic  EYES: EOMI, PERRLA, conjunctiva and sclera clear  HEENT: Moist mucous membranes  NECK: Supple, No JVD  NERVOUS SYSTEM:  Awake, confused  CHEST/LUNG: Clear to auscultation bilaterally; No rales, rhonchi, wheezing, or rubs  HEART: Regular rate and rhythm; No murmurs, rubs, or gallops  ABDOMEN: Soft, Nontender, Nondistended; Bowel sounds present  GENITOURINARY- Voiding, no palpable bladder  EXTREMITIES:  2+ Peripheral Pulses, No clubbing, cyanosis, or edema  MUSCULOSKELTAL- LT hip dressing dry, LUE in sling  SKIN-no rash, no lesion    MEDICATIONS  (STANDING):  diVALproex  milliGRAM(s) Oral two times a day  docusate sodium 100 milliGRAM(s) Oral three times a day  donepezil 10 milliGRAM(s) Oral at bedtime  enoxaparin Injectable 40 milliGRAM(s) SubCutaneous daily  lactated ringers. 1000 milliLiter(s) (100 mL/Hr) IV Continuous <Continuous>  multivitamin 1 Tablet(s) Oral daily  potassium chloride    Tablet ER 20 milliEquivalent(s) Oral daily  QUEtiapine 37.5 milliGRAM(s) Oral <User Schedule>  QUEtiapine 75 milliGRAM(s) Oral <User Schedule>  warfarin 6 milliGRAM(s) Oral once    MEDICATIONS  (PRN):  acetaminophen   Tablet 650 milliGRAM(s) Oral every 6 hours PRN For Temp greater than 38 C (100.4 F)  acetaminophen   Tablet. 650 milliGRAM(s) Oral every 6 hours PRN Headache  ondansetron Injectable 4 milliGRAM(s) IV Push every 6 hours PRN Nausea and/or Vomiting  traMADol 25 milliGRAM(s) Oral every 4 hours PRN Moderate Pain (4 - 6)  traMADol 50 milliGRAM(s) Oral every 4 hours PRN Severe Pain (7 - 10)    Assessment/Plan  #S/p mechanical fall with LT hip fracture s/p IMN/ Anemia acute blood loss from fracture  Ortho f/u appreciated  PT as tolerated  AC by coumadin and Lovenox, AC consult  Monitor HH  Pain meds prn  Bowel regimen  Incentive spirometry    #Afib chronic  Resume AC by coumadin  Daily INR    #dementia,  -cont aricept   -cont seroquel, depakote    #Dispo- PT, likely DAY tomorrow

## 2018-05-13 NOTE — PROGRESS NOTE ADULT - SUBJECTIVE AND OBJECTIVE BOX
Pt S/E at bedside, no acute events overnight, pain controlled. No complaints this AM.    Vital Signs Last 24 Hrs  T(C): 36.4 (13 May 2018 05:05), Max: 36.9 (12 May 2018 11:00)  T(F): 97.5 (13 May 2018 05:05), Max: 98.4 (12 May 2018 11:00)  HR: 94 (13 May 2018 05:05) (88 - 100)  BP: 107/59 (13 May 2018 05:05) (104/63 - 147/79)  BP(mean): --  RR: 18 (13 May 2018 05:05) (14 - 18)  SpO2: 96% (13 May 2018 05:05) (96% - 100%)    Gen: NAD, AAOx3    Left Lower Extremity:  Dressing clean dry intact  +EHL/FHL/TA/GS  SILT L3-S1  +DP/PT Pulses  Compartments soft  No calf TTP B/L    LUE: Skin intact  Moderate swelling to L shoulder   +ttp over shoulder  no ttp elsewhere  +AIN/PIN/M/R/U/Msc/Ax nerves intact  SILT C5-T1  +radial pulse  compartments soft/compressible  full painless ROM at elbow/wrist

## 2018-05-13 NOTE — CONSULT NOTE ADULT - ASSESSMENT
A 87 y/o male with a pmhx of Afib (on warfarin) and dementia, mainly confined to wheelchair presents to ED after falling out of his wheelchair at home. Found to have left intertrochanteric fx. He is s/p IMN of left femur fracture with Dr. Ambrocio on 5/12/18. INR on admission 5/11/18 was 2.31. Pt received one dose of Vit K 5 mg prior to procedure. INR subtherapeutic today of 1.03.    PLAN:  increase warfarin to 6 mg tonight and adjust per INR A 87 y/o male with a pmhx of Afib (on warfarin) and dementia, mainly confined to wheelchair presents to ED after falling out of his wheelchair at home. Found to have left intertrochanteric fx and left humerus fx. He is s/p IMN of left femur fracture with Dr. Ambrocio on 5/12/18. INR on admission 5/11/18 was 2.31. Pt received one dose of Vit K 5 mg prior to procedure. INR subtherapeutic today of 1.03.    PLAN:  increase warfarin to 6 mg tonight and adjust per INR   heparin 5000 units SQ q8h until INR > 2  PT/INR daily  CBC/BMP daily  encourage mobilization   maintain venodynes    Thank you for consult, will continue to follow. Please call [phone] for today.

## 2018-05-13 NOTE — CONSULT NOTE ADULT - SUBJECTIVE AND OBJECTIVE BOX
HPI: This is a 85 y/o male with a pmhx of Afib (on warfarin) and dementia, mainly confined to wheelchair presents to ED after falling out of his wheelchair at home. In ED, noted to have left intertrochanteric fx, s/p dilaudid for pain. (11 May 2018 17:21). He is s/p IMN of left femur fracture with Dr. Ambrocio on 5/12/18. INR on admission 5/11/18 was 2.31. Pt received one dose of Vit K 5 mg prior to procedure.     5/13/18: INR subtherapeutic today of 1.03.       Patient is a 86y old  Male who presents with a chief complaint of mechanical fall (12 May 2018 16:40)      Consulted by Dr. West for VTE prophylaxis, risk stratification, and anticoagulation management.    PAST MEDICAL & SURGICAL HISTORY:  Dementia without behavioral disturbance, unspecified dementia type  Afib  No significant past surgical history    BMI:    CrCL:    Caprini VTE Risk Score:    [id number] Score:     IMPROVE Bleeding Risk Score: 4.5    Falls Risk:   High (  )  Mod (  )  Low (  )    EBL: 50 ml      FAMILY HISTORY:  No pertinent family history in first degree relatives    Denies any personal or familial history of clotting or bleeding disorders.    Allergies    No Known Allergies    Intolerances        REVIEW OF SYSTEMS    (  )Fever	     (  )Constipation	(  )SOB			(  )Headache	(  )Dysuria  (  )Chills	     (  )Melena	(  )Dyspnea present on exertion    (  )Dizziness                    (  )Polyuria  (  )Nausea	     (  )Hematochezia	(  )Cough		                    (  )Syncope   	(  )Hematuria  (  )Vomiting    (  )Chest Pain	(  )Wheezing		(  )Weakness  (  )Diarrhea     (  )Palpitations	(  )Anorexia		(  )Myalgia    All other review of systems negative: Yes    Unable to obtain review of systems due to:      PHYSICAL EXAM:    Constitutional: Appears Well    Neurological: A& O x 3    Skin: Warm    Respiratory and Thorax: normal effort; Breath sounds: normal; No rales/wheezing/rhonchi  	  Cardiovascular: S1, S2, regular, NMBR	    Gastrointestinal: BS + x 4Q, nontender	    Genitourinary:  Bladder nondistended, nontender    Musculoskeletal:   General Right:   no muscle/joint tenderness,   normal tone, no joint swelling,   ROM: limited/full	    General Left:   no muscle/joint tenderness,   normal tone, no joint swelling,   ROM: limited/full    Hip:  Right: Dressing CDI; Drain: hemovac ; Type of drng.: serosang.; Amt. of drng: small            Left: Dressing CDI; Drain: hemovac ; Type of drng.: serosang.; Amt. of drng: small      Knee:  Right: Incision: ; Dressing CDI; Drain: hemovac ; Type of drng.: serosang.; Amt. of drng: small               Left: Incision: ; Dressing CDI; Drain: hemovac ; Type of drng.: serosang.; Amt. of drng: small    Shoulder:  Rt: Drsing CDI; Sling/immob. noted; Cap refill good; Radial pulse +; Sensation intact                     Lt: Drsing CDI; Sling/immob. noted; Cap refill good; Radial pulse +; Sensation intact    Lower extrems:   Right: no calf tenderness              negative reji's sign               + pedal pulses    Left:   no calf tenderness              negative reji's sign               + pedal pulses                          9.4    15.73 )-----------( 144      ( 13 May 2018 05:47 )             28.2       05-13    136  |  102  |  23  ----------------------------<  104<H>  4.6   |  26  |  0.94    Ca    8.4<L>      13 May 2018 05:47    TPro  7.8  /  Alb  3.9  /  TBili  0.7  /  DBili  x   /  AST  23  /  ALT  18  /  AlkPhos  120  05-11      PT/INR - ( 13 May 2018 05:47 )   PT: 11.1 sec;   INR: 1.03 ratio         PTT - ( 12 May 2018 03:34 )  PTT:31.3 sec				    MEDICATIONS  (STANDING):  diVALproex  milliGRAM(s) Oral two times a day  docusate sodium 100 milliGRAM(s) Oral three times a day  donepezil 10 milliGRAM(s) Oral at bedtime  lactated ringers. 1000 milliLiter(s) IV Continuous <Continuous>  multivitamin 1 Tablet(s) Oral daily  potassium chloride    Tablet ER 20 milliEquivalent(s) Oral daily  QUEtiapine 37.5 milliGRAM(s) Oral <User Schedule>  QUEtiapine 75 milliGRAM(s) Oral <User Schedule>  warfarin 6 milliGRAM(s) Oral once      Vital Signs Last 24 Hrs  T(C): 36.8 (13 May 2018 11:17), Max: 36.8 (13 May 2018 11:17)  T(F): 98.2 (13 May 2018 11:17), Max: 98.2 (13 May 2018 11:17)  HR: 94 (13 May 2018 11:17) (94 - 100)  BP: 137/64 (13 May 2018 11:17) (104/63 - 147/79)  BP(mean): --  RR: 18 (13 May 2018 11:17) (16 - 18)  SpO2: 97% (13 May 2018 11:17) (96% - 100%)    DVT Prophylaxis:  LMWH                   (  )  Heparin SQ           (  )  Coumadin             (  )  Xarelto                  (  )  Eliquis                   (  )  Venodynes           (  )  Ambulation          (  )  UFH                       (  )  Contraindicated  (  ) HPI: This is a 87 y/o male with a pmhx of Afib (on warfarin) and dementia, mainly confined to wheelchair presents to ED after falling out of his wheelchair at home. In ED, noted to have left intertrochanteric fx, s/p dilaudid for pain. (11 May 2018 17:21). He is s/p IMN of left femur fracture with Dr. Ambrocio on 5/12/18. INR on admission 5/11/18 was 2.31. Pt received one dose of Vit K 5 mg prior to procedure.     5/13/18: Pt seen at bedside, confused; baseline dementia. INR subtherapeutic today of 1.03. Spoke with pt's wife, Albert via telephone. Informed her of resuming warfarin and dose increase to 6 mg tonight. [phone]/ [phone]    Patient is a 86y old  Male who presents with a chief complaint of mechanical fall (12 May 2018 16:40)    Consulted by Dr. West for VTE prophylaxis, risk stratification, and anticoagulation management.    PAST MEDICAL & SURGICAL HISTORY:  Dementia without behavioral disturbance, unspecified dementia type  Afib  No significant past surgical history    BMI: 16.5?    CrCL: 41.65    Caprini VTE Risk Score: 8    [id number] Score: 2    IMPROVE Bleeding Risk Score: 4.5    Falls Risk:   High ( X )  Mod (  )  Low (  )    EBL: 50 ml    FAMILY HISTORY:  No pertinent family history in first degree relatives    Denies any personal or familial history of clotting or bleeding disorders.    Allergies    No Known Allergies    Intolerances    REVIEW OF SYSTEMS    (  )Fever	     (  )Constipation	(  )SOB			(  )Headache	(  )Dysuria  (  )Chills	     (  )Melena	(  )Dyspnea present on exertion    (  )Dizziness                    (  )Polyuria  (  )Nausea	     (  )Hematochezia	(  )Cough		                    (  )Syncope   	(  )Hematuria  (  )Vomiting    (  )Chest Pain	(  )Wheezing		(  )Weakness  (  )Diarrhea     (  )Palpitations	(  )Anorexia		(  )Myalgia    All other review of systems negative: Yes    PHYSICAL EXAM:    Constitutional: Appears Well    Neurological: A& O x 3    Skin: Warm    Respiratory and Thorax: normal effort; Breath sounds: normal; No rales/wheezing/rhonchi  	  Cardiovascular: S1, S2, regular, NMBR	    Gastrointestinal: BS + x 4Q, nontender	    Genitourinary:  Bladder nondistended, nontender    Musculoskeletal:   General Right:   no muscle/joint tenderness,   normal tone, no joint swelling,   ROM: full	    General Left:   no muscle/joint tenderness,   normal tone, no joint swelling,   ROM: limited    Hip: Left: Aquacel dressing CDI    Shoulder: Lt: Drsing CDI; Sling/immob. noted; Cap refill good; Radial pulse +; Sensation intact    Lower extrems:   Right: no calf tenderness              negative reji's sign               + pedal pulses    Left:   no calf tenderness              negative reji's sign               + pedal pulses                          9.4    15.73 )-----------( 144      ( 13 May 2018 05:47 )             28.2       05-13    136  |  102  |  23  ----------------------------<  104<H>  4.6   |  26  |  0.94    Ca    8.4<L>      13 May 2018 05:47    TPro  7.8  /  Alb  3.9  /  TBili  0.7  /  DBili  x   /  AST  23  /  ALT  18  /  AlkPhos  120  05-11    PT/INR - ( 13 May 2018 05:47 )   PT: 11.1 sec;   INR: 1.03 ratio    PTT - ( 12 May 2018 03:34 )  PTT:31.3 sec				    MEDICATIONS  (STANDING):  diVALproex  milliGRAM(s) Oral two times a day  docusate sodium 100 milliGRAM(s) Oral three times a day  donepezil 10 milliGRAM(s) Oral at bedtime  heparin  Injectable 5000 Unit(s) SubCutaneous every 8 hours  lactated ringers. 1000 milliLiter(s) (100 mL/Hr) IV Continuous <Continuous>  multivitamin 1 Tablet(s) Oral daily  potassium chloride    Tablet ER 20 milliEquivalent(s) Oral daily  QUEtiapine 37.5 milliGRAM(s) Oral <User Schedule>  QUEtiapine 75 milliGRAM(s) Oral <User Schedule>  warfarin 6 milliGRAM(s) Oral once    Vital Signs Last 24 Hrs  T(C): 36.8 (13 May 2018 11:17), Max: 36.8 (13 May 2018 11:17)  T(F): 98.2 (13 May 2018 11:17), Max: 98.2 (13 May 2018 11:17)  HR: 94 (13 May 2018 11:17) (94 - 100)  BP: 137/64 (13 May 2018 11:17) (104/63 - 147/79)  BP(mean): --  RR: 18 (13 May 2018 11:17) (16 - 18)  SpO2: 97% (13 May 2018 11:17) (96% - 100%)    IMAGING:  EXAM:  SHOULDER COMP  MIN 2 VIEWS-LT                        *** ADDENDUM 05/12/2018  ***  Images reviewed at the request of Dr. Meza. There is a subcapital   fracture of the humerus with mild impaction. Images were reviewed and   results were discussed with Dr. Meza at 11:45 AM on May 12, 2018.     EXAM:  XR FEMUR 2 VIEWS LT                        EXAM:  XR HIP INCL PELV 2-3V LT                        PROCEDURE DATE:  05/11/2018    FINDINGS/IMPRESSION:  There is a comminuted fractures of the left femoral intratrochanteric region. The left femoral head remains well located with valgus deformity. Right hip prosthesis is intact.    DVT Prophylaxis:  LMWH                   (  )  Heparin SQ           ( X )  Coumadin             ( X )  Xarelto                  (  )  Eliquis                   (  )  Venodynes           ( X )  Ambulation          (X  )  UFH                       (  )  Contraindicated  (  )

## 2018-05-13 NOTE — PROGRESS NOTE ADULT - ASSESSMENT
86M with L proximal humerus fx, now s/p L hip IMN POD 1  Analgesia  DVT ppx  WBAT LLE  NWB LUE in sling  PT/OT  Incentive spirometry  No surgery planned for L proximal humerus fx  DC planning

## 2018-05-13 NOTE — PHYSICAL THERAPY INITIAL EVALUATION ADULT - PERTINENT HX OF CURRENT PROBLEM, REHAB EVAL
Pt. presents to HH s/p fall out of wheelchair onto L side. Pt.'s wife and HHA witnessed fall. L xray hip: L femoral intratrochanteric fractures; L shoulder xray: subcapital fracture of humerus

## 2018-05-13 NOTE — PHYSICAL THERAPY INITIAL EVALUATION ADULT - MANUAL MUSCLE TESTING RESULTS, REHAB EVAL
RUE and RLE grossly assessed due to mentation, at least 3/5. LUE not tested, LLE limited movement and ROM due to pain

## 2018-05-13 NOTE — CONSULT NOTE ADULT - CONSULT REASON
Left hip and proximal humerus fx
Afib, anticoagulation management, VTE prophylaxis, risk stratification

## 2018-05-14 VITALS
RESPIRATION RATE: 18 BRPM | TEMPERATURE: 99 F | SYSTOLIC BLOOD PRESSURE: 114 MMHG | HEART RATE: 92 BPM | DIASTOLIC BLOOD PRESSURE: 50 MMHG | OXYGEN SATURATION: 94 %

## 2018-05-14 LAB
ANION GAP SERPL CALC-SCNC: 7 MMOL/L — SIGNIFICANT CHANGE UP (ref 5–17)
BLD GP AB SCN SERPL QL: SIGNIFICANT CHANGE UP
BUN SERPL-MCNC: 20 MG/DL — SIGNIFICANT CHANGE UP (ref 7–23)
CALCIUM SERPL-MCNC: 8.3 MG/DL — LOW (ref 8.5–10.1)
CHLORIDE SERPL-SCNC: 103 MMOL/L — SIGNIFICANT CHANGE UP (ref 96–108)
CO2 SERPL-SCNC: 26 MMOL/L — SIGNIFICANT CHANGE UP (ref 22–31)
CREAT SERPL-MCNC: 0.68 MG/DL — SIGNIFICANT CHANGE UP (ref 0.5–1.3)
GLUCOSE SERPL-MCNC: 94 MG/DL — SIGNIFICANT CHANGE UP (ref 70–99)
HCT VFR BLD CALC: 25.1 % — LOW (ref 39–50)
HGB BLD-MCNC: 8.5 G/DL — LOW (ref 13–17)
INR BLD: 1.14 RATIO — SIGNIFICANT CHANGE UP (ref 0.88–1.16)
MCHC RBC-ENTMCNC: 31.8 PG — SIGNIFICANT CHANGE UP (ref 27–34)
MCHC RBC-ENTMCNC: 33.9 GM/DL — SIGNIFICANT CHANGE UP (ref 32–36)
MCV RBC AUTO: 94 FL — SIGNIFICANT CHANGE UP (ref 80–100)
NRBC # BLD: 0 /100 WBCS — SIGNIFICANT CHANGE UP (ref 0–0)
PLATELET # BLD AUTO: 128 K/UL — LOW (ref 150–400)
POTASSIUM SERPL-MCNC: 4.1 MMOL/L — SIGNIFICANT CHANGE UP (ref 3.5–5.3)
POTASSIUM SERPL-SCNC: 4.1 MMOL/L — SIGNIFICANT CHANGE UP (ref 3.5–5.3)
PROTHROM AB SERPL-ACNC: 12.3 SEC — SIGNIFICANT CHANGE UP (ref 9.8–12.7)
RBC # BLD: 2.67 M/UL — LOW (ref 4.2–5.8)
RBC # FLD: 13 % — SIGNIFICANT CHANGE UP (ref 10.3–14.5)
SODIUM SERPL-SCNC: 136 MMOL/L — SIGNIFICANT CHANGE UP (ref 135–145)
TYPE + AB SCN PNL BLD: SIGNIFICANT CHANGE UP
WBC # BLD: 11.96 K/UL — HIGH (ref 3.8–10.5)
WBC # FLD AUTO: 11.96 K/UL — HIGH (ref 3.8–10.5)

## 2018-05-14 PROCEDURE — 99233 SBSQ HOSP IP/OBS HIGH 50: CPT

## 2018-05-14 RX ORDER — HEPARIN SODIUM 5000 [USP'U]/ML
5000 INJECTION INTRAVENOUS; SUBCUTANEOUS
Qty: 0 | Refills: 0 | COMMUNITY
Start: 2018-05-14

## 2018-05-14 RX ORDER — ACETAMINOPHEN 500 MG
2 TABLET ORAL
Qty: 0 | Refills: 0 | COMMUNITY
Start: 2018-05-14

## 2018-05-14 RX ORDER — TRAMADOL HYDROCHLORIDE 50 MG/1
1 TABLET ORAL
Qty: 0 | Refills: 0 | COMMUNITY
Start: 2018-05-14

## 2018-05-14 RX ORDER — TRAMADOL HYDROCHLORIDE 50 MG/1
0.5 TABLET ORAL
Qty: 0 | Refills: 0 | COMMUNITY
Start: 2018-05-14

## 2018-05-14 RX ORDER — WARFARIN SODIUM 2.5 MG/1
1 TABLET ORAL
Qty: 0 | Refills: 0 | COMMUNITY

## 2018-05-14 RX ORDER — DOCUSATE SODIUM 100 MG
1 CAPSULE ORAL
Qty: 0 | Refills: 0 | COMMUNITY
Start: 2018-05-14

## 2018-05-14 RX ORDER — WARFARIN SODIUM 2.5 MG/1
2.5 TABLET ORAL DAILY
Qty: 0 | Refills: 0 | Status: DISCONTINUED | OUTPATIENT
Start: 2018-05-14 | End: 2018-05-14

## 2018-05-14 RX ORDER — FUROSEMIDE 40 MG
1 TABLET ORAL
Qty: 0 | Refills: 0 | COMMUNITY

## 2018-05-14 RX ORDER — POTASSIUM CHLORIDE 20 MEQ
1 PACKET (EA) ORAL
Qty: 0 | Refills: 0 | COMMUNITY

## 2018-05-14 RX ORDER — WARFARIN SODIUM 2.5 MG/1
6 TABLET ORAL DAILY
Qty: 0 | Refills: 0 | Status: DISCONTINUED | OUTPATIENT
Start: 2018-05-14 | End: 2018-05-14

## 2018-05-14 RX ORDER — WARFARIN SODIUM 2.5 MG/1
1 TABLET ORAL
Qty: 0 | Refills: 0 | COMMUNITY
Start: 2018-05-14

## 2018-05-14 RX ADMIN — TRAMADOL HYDROCHLORIDE 50 MILLIGRAM(S): 50 TABLET ORAL at 05:58

## 2018-05-14 RX ADMIN — DIVALPROEX SODIUM 250 MILLIGRAM(S): 500 TABLET, DELAYED RELEASE ORAL at 05:50

## 2018-05-14 RX ADMIN — TRAMADOL HYDROCHLORIDE 50 MILLIGRAM(S): 50 TABLET ORAL at 06:29

## 2018-05-14 RX ADMIN — Medication 1 TABLET(S): at 11:11

## 2018-05-14 RX ADMIN — QUETIAPINE FUMARATE 37.5 MILLIGRAM(S): 200 TABLET, FILM COATED ORAL at 15:16

## 2018-05-14 RX ADMIN — QUETIAPINE FUMARATE 75 MILLIGRAM(S): 200 TABLET, FILM COATED ORAL at 05:50

## 2018-05-14 RX ADMIN — HEPARIN SODIUM 5000 UNIT(S): 5000 INJECTION INTRAVENOUS; SUBCUTANEOUS at 15:16

## 2018-05-14 RX ADMIN — DIVALPROEX SODIUM 250 MILLIGRAM(S): 500 TABLET, DELAYED RELEASE ORAL at 17:11

## 2018-05-14 RX ADMIN — HEPARIN SODIUM 5000 UNIT(S): 5000 INJECTION INTRAVENOUS; SUBCUTANEOUS at 05:50

## 2018-05-14 RX ADMIN — Medication 20 MILLIEQUIVALENT(S): at 11:11

## 2018-05-14 NOTE — PROGRESS NOTE ADULT - ASSESSMENT
A 85 y/o male with a pmhx of Afib (on warfarin) and dementia, mainly confined to wheelchair presents to ED after falling out of his wheelchair at home. Found to have left intertrochanteric fx and left humerus fx. He is s/p IMN of left femur fracture with Dr. Ambrocio on 5/12/18. INR on admission 5/11/18 was 2.31. Pt received one dose of Vit K 5 mg prior to procedure. INR subtherapeutic today of 1.03.    PLAN:  cont.  warfarin to 6 mg tonight and adjust per INR   heparin 5000 units SQ q8h until INR > 2  PT/INR daily  CBC/BMP daily  encourage mobilization   maintain venodynes     will continue to follow. .

## 2018-05-14 NOTE — PROGRESS NOTE ADULT - SUBJECTIVE AND OBJECTIVE BOX
CC- s/p mechanical fall    HPI:  87 y/o mainly wheelchair bound male with h/o afib on vka and dementia presents after fall out of his wheelchair at home today.  history provided by wife as patient has baseline dementia.  states that he was leaning over out of chair and fell on his left side.  unable to ambulate, c/o severe pain.  EMS was called.  no LOC, head trauma.  wife and home aide witnessed fall.  In ED, noted to have left intertrochanteric fx, s/p dilaudid for pain. (11 May 2018 17:21)    5/12/18- s/p IMN LT hip today  5/13/18 confused  5/14/18 confused    Review of system- All 10 systems reviewed and is as per HPI otherwise negative.     Vital Signs Last 24 Hrs  T(C): 37.1 (14 May 2018 13:06), Max: 37.1 (14 May 2018 13:06)  T(F): 98.7 (14 May 2018 13:06), Max: 98.7 (14 May 2018 13:06)  HR: 94 (14 May 2018 13:06) (91 - 99)  BP: 94/45 (14 May 2018 13:06) (94/45 - 115/69)  BP(mean): --  RR: 18 (14 May 2018 13:06) (18 - 18)  SpO2: 94% (14 May 2018 13:06) (94% - 99%)    LABS:                                          8.5    11.96 )-----------( 128      ( 14 May 2018 07:46 )             25.1     136    |  103    |  20     ----------------------------<  94     4.1     |  26     |  0.68     Ca    8.3        14 May 2018 07:46  PT/INR - ( 14 May 2018 07:46 )   PT: 12.3 sec;   INR: 1.14 ratio      RADIOLOGY & ADDITIONAL TESTS:  EXAM:  CT ABDOMEN AND PELVIS                        EXAM:  CT CHEST                        *** ADDENDUM 05/12/2018  ***    Images were reviewed at the request of Dr. Meza. There is a impacted   subcapital fracture of the left humerus. Results were discussed with Dr. Meza at 11:45 AM on 5/12/2018  *** END OF ADDENDUM 05/12/2018  ***  PROCEDURE DATE:  05/11/2018      INTERPRETATION:  CT CHEST, CT ABDOMEN AND PELVIS    HISTORY:  fall with left hip pain, taking blood thinners    Technique: CT of the chest, abdomen, and pelvis is performed without oral   or intravenous contrast. Axial images are supplemented with coronal and   sagittal reformations. This study was performed using automatic exposure   control (radiation dose reduction software) to obtain a diagnostic image   quality scan with patient dose as low as reasonably achievable.    Contrast:     None    Comparison: Chest CT 2/21/2016, CT abdomen and pelvis 1/13/2008    Findings:    LUNGS, AIRWAYS: The central airways are patent. The lungs are clear.  PLEURA: No pleural effusion, hemothorax, or pneumothorax.  HEART AND VESSELS: Normal heart size. No pericardial effusion. Coronary   artery calcifications are present. Normal caliber thoracic aorta.  MEDIASTINUMAND TAMANNA: No adenopathy or hematoma.  CHEST WALL: No hematoma.    LIVER: Normal.  SPLEEN: Normal.  PANCREAS: Diffuse atrophy.  GALLBLADDER/BILIARY TREE: Nondilated. Normal gallbladder.  ADRENALS: Normal.  KIDNEYS: Punctate right renal calculus. Left duplicated collecting system.  LYMPHADENOPATHY/RETROPERITONEUM: No adenopathy or hematoma.  VASCULATURE: Aortoiliac atherosclerosis without aneurysm.  BOWEL: No bowel-related abnormality. Small hiatal hernia  PELVIC VISCERA: Prostate radiation fiducials.  PELVIC LYMPH NODES: No pelvic adenopathy or hematoma.  PERITONEUM/ABDOMINAL WALL: No free air, ascites, or hemoperitoneum.  SKELETAL: Comminuted left intertrochanteric fracture with varus   angulation. Status post L4 kyphoplasty.    IMPRESSION:   Comminuted left intertrochanteric fracture with varus angulation. No   surrounding hematoma.  No acute traumatic injury in the chest, abdomen, or pelvis.  ***Please see the addendum at the top of this report. It may contain   additional important information or changes.****    EXAM:  SHOULDER COMP  MIN 2 VIEWS-LT                        *** ADDENDUM 05/12/2018  ***  Images reviewed at the request of Dr. Meza. There is a subcapital   fracture of the humerus with mild impaction. Images were reviewed and   results were discussed with Dr. Meza at 11:45 AM on May 12, 2018.  *** END OF ADDENDUM 05/12/2018  ***    PROCEDURE DATE:  05/11/2018    INTERPRETATION:    Radiographs of the LEFT shoulder      CLINICAL INFORMATION:  Fall  Pain.  TECHNIQUE:  Frontal views in internal and external rotation of the   shoulder were obtained.  A Y-view was also obtained.       FINDINGS:   No prior examinations are available for review.  The osseous structures of the shoulder are intact.   No fracture is seen.    No destructive bone lesion is identified.  The glenohumeral joint is located and intact.  The acromioclavicular   joint appears aligned and intact.  No pathologic calcifications or other soft tissue abnormalities are seen.    The visualized chest wall and ribs are intact.  No radiopaque foreign   body is identified.     IMPRESSION:   No definite fracture. If symptoms persist, CT scan is   recommended.          ***Please see the addendum at the top of this report. It may contain   additional important information or changes.****    PHYSICAL EXAM:  GENERAL: NAD, well-groomed, well-developed  HEAD:  Atraumatic, Normocephalic  EYES: EOMI, PERRLA, conjunctiva and sclera clear  HEENT: Moist mucous membranes  NECK: Supple, No JVD  NERVOUS SYSTEM:  Awake, confused  CHEST/LUNG: Clear to auscultation bilaterally; No rales, rhonchi, wheezing, or rubs  HEART: Regular rate and rhythm; No murmurs, rubs, or gallops  ABDOMEN: Soft, Nontender, Nondistended; Bowel sounds present  GENITOURINARY- Voiding, no palpable bladder  EXTREMITIES:  2+ Peripheral Pulses, No clubbing, cyanosis, or edema  MUSCULOSKELTAL- LT hip dressing dry, LUE in sling  SKIN-no rash, no lesion    MEDICATIONS  (STANDING):  diVALproex  milliGRAM(s) Oral two times a day  docusate sodium 100 milliGRAM(s) Oral three times a day  donepezil 10 milliGRAM(s) Oral at bedtime  enoxaparin Injectable 40 milliGRAM(s) SubCutaneous daily  lactated ringers. 1000 milliLiter(s) (100 mL/Hr) IV Continuous <Continuous>  multivitamin 1 Tablet(s) Oral daily  potassium chloride    Tablet ER 20 milliEquivalent(s) Oral daily  QUEtiapine 37.5 milliGRAM(s) Oral <User Schedule>  QUEtiapine 75 milliGRAM(s) Oral <User Schedule>  warfarin 6 milliGRAM(s) Oral once    MEDICATIONS  (PRN):  acetaminophen   Tablet 650 milliGRAM(s) Oral every 6 hours PRN For Temp greater than 38 C (100.4 F)  acetaminophen   Tablet. 650 milliGRAM(s) Oral every 6 hours PRN Headache  ondansetron Injectable 4 milliGRAM(s) IV Push every 6 hours PRN Nausea and/or Vomiting  traMADol 25 milliGRAM(s) Oral every 4 hours PRN Moderate Pain (4 - 6)  traMADol 50 milliGRAM(s) Oral every 4 hours PRN Severe Pain (7 - 10)    Assessment/Plan  #S/p mechanical fall with LT hip fracture s/p IMN/ Anemia acute blood loss from fracture  Ortho f/u appreciated  PT as tolerated  AC by coumadin and Lovenox, AC consult  For 1 Unit PRBC transfusion prior to discharge  Pain meds prn  Bowel regimen  Incentive spirometry    #Afib chronic  Resume AC by coumadin  Daily INR    #dementia,  -cont aricept   -cont seroquel, depakote    #Dispo- DAY later on today after receives a unit of blood

## 2018-05-14 NOTE — PROGRESS NOTE ADULT - SUBJECTIVE AND OBJECTIVE BOX
HPI: This is a 87 y/o male with a pmhx of Afib (on warfarin) and dementia, mainly confined to wheelchair presents to ED after falling out of his wheelchair at home. In ED, noted to have left intertrochanteric fx, s/p dilaudid for pain. (11 May 2018 17:21). He is s/p IMN of left femur fracture with Dr. Ambrocio on 5/12/18. INR on admission 5/11/18 was 2.31. Pt received one dose of Vit K 5 mg prior to procedure.     Patient is a 86y old  Male who presents with a chief complaint of mechanical fall (12 May 2018 16:40)    Consulted by Dr. West for VTE prophylaxis, risk stratification, and anticoagulation management.    PAST MEDICAL & SURGICAL HISTORY:  Dementia without behavioral disturbance, unspecified dementia type  Afib  No significant past surgical history    BMI: 16.5?    CrCL: 41.65    Capgeovannyi VTE Risk Score: 8    [id number] Score: 2    IMPROVE Bleeding Risk Score: 4.5    Falls Risk:   High ( X )  Mod (  )  Low (  )    EBL: 50 ml  5/13/18: Pt seen at bedside, confused; baseline dementia. INR subtherapeutic today of 1.03. Spoke with pt's wife, Albert via telephone. Informed her of resuming warfarin and dose increase to 6 mg tonight. [phone]/ [phone]  5-14-18 Pt seen at bedside asleep.  poss transfer to rehab today. pt on coumadin for A. Fib normal dose is 5mg daily spoke with pt's wife on the phone.  pt did receive vit k po on the 11th.   FAMILY HISTORY:  No pertinent family history in first degree relatives    Denies any personal or familial history of clotting or bleeding disorders.    Allergies    No Known Allergies    Intolerances    REVIEW OF SYSTEMS    (  )Fever	     (  )Constipation	(  )SOB			(  )Headache	(  )Dysuria  (  )Chills	     (  )Melena	(  )Dyspnea present on exertion    (  )Dizziness                    (  )Polyuria  (  )Nausea	     (  )Hematochezia	(  )Cough		                    (  )Syncope   	(  )Hematuria  (  )Vomiting    (  )Chest Pain	(  )Wheezing		(  )Weakness  (  )Diarrhea     (  )Palpitations	(  )Anorexia		(  )Myalgia    All other review of systems negative: Yes    PHYSICAL EXAM:    Constitutional: Appears Well    Neurological: sleeping     Skin: Warm    Respiratory and Thorax: normal effort; Breath sounds: normal; No rales/wheezing/rhonchi  	  Cardiovascular: S1, S2, regular, NMBR	    Gastrointestinal: BS + x 4Q, nontender	    Genitourinary:  Bladder nondistended, nontender    Musculoskeletal:   General Right:   no muscle/joint tenderness,   normal tone, no joint swelling,   ROM: full	    General Left:   no muscle/joint tenderness,   normal tone, no joint swelling,   ROM: limited    Hip: Left: Aquacel dressing CDI    Shoulder: Lt: Drsing CDI; Sling/immob. noted; Cap refill good; Radial pulse +; Sensation intact    Lower extrems:   Right: no calf tenderness              negative reji's sign               + pedal pulses    Left:   no calf tenderness              negative reji's sign               + pedal pulses                        8.5    11.96 )-----------( 128      ( 14 May 2018 07:46 )             25.1       05-14    136  |  103  |  20  ----------------------------<  94  4.1   |  26  |  0.68    Ca    8.3<L>      14 May 2018 07:46        PT/INR - ( 14 May 2018 07:46 )   PT: 12.3 sec;   INR: 1.14 ratio                                 9.4    15.73 )-----------( 144      ( 13 May 2018 05:47 )             28.2       05-13    136  |  102  |  23  ----------------------------<  104<H>  4.6   |  26  |  0.94    Ca    8.4<L>      13 May 2018 05:47    TPro  7.8  /  Alb  3.9  /  TBili  0.7  /  DBili  x   /  AST  23  /  ALT  18  /  AlkPhos  120  05-11  PT/INR - ( 14 May 2018 07:46 )   PT: 12.3 sec;   INR: 1.14 ratio  PT/INR - ( 13 May 2018 05:47 )   PT: 11.1 sec;   INR: 1.03 ratio    PTT - ( 12 May 2018 03:34 )  PTT:31.3 sec				    MEDICATIONS  (STANDING):  diVALproex Sprinkle 250 milliGRAM(s) Oral two times a day  docusate sodium 100 milliGRAM(s) Oral three times a day  donepezil 10 milliGRAM(s) Oral at bedtime  heparin  Injectable 5000 Unit(s) SubCutaneous every 8 hours  lactated ringers. 1000 milliLiter(s) IV Continuous <Continuous>  multivitamin 1 Tablet(s) Oral daily  potassium chloride    Tablet ER 20 milliEquivalent(s) Oral daily  QUEtiapine 37.5 milliGRAM(s) Oral <User Schedule>  QUEtiapine 75 milliGRAM(s) Oral <User Schedule>  warfarin 6 milliGRAM(s) Oral daily      Vital Signs Last 24 Hrs  T(C): 37.1 (05-14-18 @ 13:06), Max: 37.1 (05-14-18 @ 13:06)  T(F): 98.7 (05-14-18 @ 13:06), Max: 98.7 (05-14-18 @ 13:06)  HR: 94 (05-14-18 @ 13:06) (91 - 99)  BP: 94/45 (05-14-18 @ 13:06) (94/45 - 115/69)  BP(mean): --  RR: 18 (05-14-18 @ 13:06) (18 - 18)  SpO2: 94% (05-14-18 @ 13:06) (94% - 99%)    IMAGING:  EXAM:  SHOULDER COMP  MIN 2 VIEWS-LT                        *** ADDENDUM 05/12/2018  ***  Images reviewed at the request of Dr. Meza. There is a subcapital   fracture of the humerus with mild impaction. Images were reviewed and   results were discussed with Dr. Meza at 11:45 AM on May 12, 2018.     EXAM:  XR FEMUR 2 VIEWS LT                        EXAM:  XR HIP INCL PELV 2-3V LT                        PROCEDURE DATE:  05/11/2018    FINDINGS/IMPRESSION:  There is a comminuted fractures of the left femoral intratrochanteric region. The left femoral head remains well located with valgus deformity. Right hip prosthesis is intact.    DVT Prophylaxis:  LMWH                   (  )  Heparin SQ           ( X )  Coumadin             ( X )  Xarelto                  (  )  Eliquis                   (  )  Venodynes           ( X )  Ambulation          (X  )  UFH                       (  )  Contraindicated  (  )

## 2018-05-17 DIAGNOSIS — E78.5 HYPERLIPIDEMIA, UNSPECIFIED: ICD-10-CM

## 2018-05-17 DIAGNOSIS — F03.90 UNSPECIFIED DEMENTIA WITHOUT BEHAVIORAL DISTURBANCE: ICD-10-CM

## 2018-05-17 DIAGNOSIS — R79.1 ABNORMAL COAGULATION PROFILE: ICD-10-CM
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
## 2018-05-17 DIAGNOSIS — S72.142A DISPLACED INTERTROCHANTERIC FRACTURE OF LEFT FEMUR, INITIAL ENCOUNTER FOR CLOSED FRACTURE: ICD-10-CM

## 2018-05-17 DIAGNOSIS — D62 ACUTE POSTHEMORRHAGIC ANEMIA: ICD-10-CM

## 2018-05-17 DIAGNOSIS — I48.2 CHRONIC ATRIAL FIBRILLATION: ICD-10-CM

## 2018-05-17 DIAGNOSIS — S42.202A UNSPECIFIED FRACTURE OF UPPER END OF LEFT HUMERUS, INITIAL ENCOUNTER FOR CLOSED FRACTURE: ICD-10-CM
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
## 2020-10-12 NOTE — ED ADULT NURSE NOTE - PAIN: BODY LOCATION
Ms. Arauz is a 89y/o F with h/o osteoporosis, HTN and left intertrochanteric fracture s/p IMN on 12/12/19 p/w fall Ms. Arauz is a 89y/o F with h/o osteoporosis, HTN and left intertrochanteric fracture s/p IMN on 12/12/19 p/w fall.  Pt reports having recent pain in her left him and numbness in the area. She describes falling ~1 wk ago, but does not recall further details. She also endorses feeling cold, decreased PO intake, decreased sense of taste and fatigue in recent weeks.  Collateral obtained from pt's daughter (Isela) and grandaughter whom she lives with (April)> Pt has been having poor PO intake and diffuse weakness/not wanting to move for the past two months. Last week she had a fall while standing. She walked out of the bathroom with her cane, but dropped it and subsequently fell forward. Granddaughter reports pt was at normal mental status after the fall and was able to be helped up. Family also reports intermittent episodes of loose stool and recent dark/black stool    Ms. Arauz is a 89y/o F with h/o osteoporosis, HTN and left intertrochanteric fracture s/p IMN on 12/12/19 p/w fall.  Pt reports having recent pain in her left him and numbness in the area. She describes falling ~1 wk ago, but does not recall further details. She also endorses feeling cold, decreased PO intake, decreased sense of taste and fatigue in recent weeks.  Collateral obtained from pt's daughter (Isela) and granddaughter whom she lives with (April): Pt has been having poor PO intake and diffuse weakness/not wanting to move for the past two months. Last week she had a fall while standing. She walked out of the bathroom with her cane, but dropped it and subsequently fell forward. Granddaughter reports pt was at normal mental status after the fall and was able to be helped up. Family also reports intermittent episodes of loose stool and recent dark/black stool    Right:/forehead Ms. Arauz is a 89y/o F with h/o osteoporosis, HTN and left intertrochanteric fracture s/p IMN on 12/12/19 p/w fall.  Pt reports having recent pain in her left him and numbness in the area. She describes falling ~1 wk ago, but does not recall further details. She also endorses feeling cold, decreased PO intake, decreased sense of taste and fatigue in recent weeks.  Collateral obtained from pt's daughter (Isela) and granddaughter whom she lives with (April): Pt has been having poor PO intake and diffuse weakness/not wanting to move for the past two months. Last week she had a fall while standing. She walked out of the bathroom with her cane, but dropped it and subsequently fell forward. Granddaughter reports pt was at normal mental status after the fall and was able to be helped up. Family also reports intermittent episodes of loose stool and recent dark/black stool. Since the recent fall pt has been less mobile and family recently purchased a bedpan for her.   Ms. Arazu is a 89y/o F with h/o osteoporosis, HTN and left intertrochanteric fracture s/p IMN on 12/12/19 p/w fall.  Pt reports having recent pain in her left hip and associated numbness and weakness in the area. She describes falling ~1 wk ago with facial trauma, denies LOC, but does not recall further details. She also endorses feeling cold, decreased PO intake, decreased sense of taste and fatigue in recent weeks. Endorses loss of taste in recent weeks, and poor appetite since rehab months ago.   Collateral obtained from pt's daughter (Isela) and granddaughter whom she lives with (April): Pt has been having poor PO intake and diffuse weakness/not wanting to move for the past two months. Last week she had a fall while standing. She walked out of the bathroom with her cane, but dropped it and subsequently fell forward. Granddaughter reports pt was at normal mental status after the fall and was able to be helped up. Family also reports intermittent episodes of loose stool and recent dark/black stool. Since the recent fall pt has been less mobile and family recently purchased a bedpan for her.   Ms. Arauz is a 89y/o F with h/o osteoporosis, HTN and left intertrochanteric fracture s/p IMN on 12/12/19 p/w fall.  Pt reports having recent pain in her left hip and associated numbness and weakness in the area. She describes falling ~1 wk ago with facial trauma, denies LOC, but does not recall further details. She also endorses feeling cold, decreased PO intake, decreased sense of taste and fatigue in recent weeks. Collateral obtained from pt's daughter (Isela) and granddaughter whom she lives with (April): Pt has been having poor PO intake and diffuse weakness/not wanting to move for the past two months. Last week she had a fall while standing. She walked out of the bathroom with her cane, but dropped it and subsequently fell forward. Granddaughter reports pt was at normal mental status after the fall and was able to be helped up. Family also reports intermittent episodes of loose stool and recent dark/black stool. Since the recent fall pt has been less mobile and family recently purchased a bedpan for her.

## 2021-02-16 NOTE — INPATIENT CERTIFICATION FOR MEDICARE PATIENTS - PHYSICIAN CONCUR
Per MD Loco fluids to be completed over 15 minutes.    I concur with the Admission Order and I certify that services are provided in accordance with Section 42 CFR § 412.3

## 2021-08-23 NOTE — DISCHARGE NOTE ADULT - MEDICATION SUMMARY - MEDICATIONS TO TAKE
[facility] NEPHROLOGY SERVICES, [facility]  NEPHROLOGY AND HYPERTENSION  [address]  [address]  [place] [address]  [phone]    MD MEGA OH, MD YASMIN VERDUZCO, MD AVILA AGUIAR, MD GHISLAINE MCPHERSON, MD GAURAV ALICEA MD          Patient events noted feels better     MEDICATIONS  (STANDING):  amLODIPine   Tablet 10 milliGRAM(s) Oral daily  atorvastatin 40 milliGRAM(s) Oral at bedtime  doxazosin 4 milliGRAM(s) Oral at bedtime  furosemide   Injectable 60 milliGRAM(s) IV Push every 8 hours  heparin   Injectable 5000 Unit(s) SubCutaneous every 8 hours  labetalol 300 milliGRAM(s) Oral every 8 hours    MEDICATIONS  (PRN):  ALPRAZolam 0.25 milliGRAM(s) Oral two times a day PRN Anxiety  nitroglycerin     SubLingual 0.4 milliGRAM(s) SubLingual every 5 minutes PRN Chest Pain      08-22-21 @ 07:01  -  08-23-21 @ 07:00  --------------------------------------------------------  IN: 476 mL / OUT: 2050 mL / NET: -1574 mL    08-23-21 @ 07:01  -  08-23-21 @ 20:47  --------------------------------------------------------  IN: 680 mL / OUT: 0 mL / NET: 680 mL      PHYSICAL EXAM:      T(C): 37 (08-23-21 @ 10:30), Max: 37 (08-23-21 @ 10:30)  HR: 82 (08-23-21 @ 16:06) (80 - 97)  BP: 118/68 (08-23-21 @ 16:06) (93/59 - 118/68)  RR: 18 (08-23-21 @ 10:30) (18 - 20)  SpO2: 97% (08-23-21 @ 10:30) (97% - 100%)  Wt(kg): --  Lungs scattered rhonchi decreased BS at bases  Heart S1S2  Abd soft NT ND  Extremities:   tr edema                                    9.6    7.23  )-----------( 192      ( 23 Aug 2021 06:26 )             29.6     08-23    140  |  103  |  51<H>  ----------------------------<  92  3.6   |  31  |  3.70<H>    Ca    7.8<L>      23 Aug 2021 06:26    TPro  6.8  /  Alb  3.1<L>  /  TBili  0.5  /  DBili  x   /  AST  230<H>  /  ALT  313<H>  /  AlkPhos  151<H>  08-22    ABG - ( 22 Aug 2021 02:57 )  pH, Arterial: x     pH, Blood: 7.41  /  pCO2: 42    /  pO2: 372   / HCO3: 26    / Base Excess: 1.9   /  SaO2: 100         Trend Bun/Cr  08-23-21 @ 06:26  BUN/CR -  51<H> / 3.70<H>  08-22-21 @ 02:51  BUN/CR -  49<H> / 3.94<H>  08-19-21 @ 07:49  BUN/CR -  35<H> / 3.18<H>  08-18-21 @ 05:18  BUN/CR -  32<H> / 2.82<H>  08-17-21 @ 22:16  BUN/CR -  27<H> / 2.90<H>  08-17-21 @ 12:47  BUN/CR -  28<H> / 3.03<H>  08-10-21 @ 15:09  BUN/CR -  42<H> / 3.10<H>  04-12-21 @ 03:53  BUN/CR -  38<H> / 2.70<H>  10-26-20 @ 02:37  BUN/CR -  47<H> / 3.22<H>  12-14-19 @ 15:01  BUN/CR -  36<H> / 3.13<H>  07-13-19 @ 10:27  BUN/CR -  31<H> / 2.82<H>  06-05-19 @ 06:25  BUN/CR -  53<H> / 4.37<H>        LIVER FUNCTIONS - ( 22 Aug 2021 02:51 )  Alb: 3.1 g/dL / Pro: 6.8 gm/dL / ALK PHOS: 151 U/L / ALT: 313 U/L / AST: 230 U/L / GGT: x           Creatinine Trend: 3.70<--, 3.94<--, 3.18<--, 2.82<--, 2.90<--, 3.03<--          Assessment   CKD 4; flash pulm edema; renal doppler suggestive of bilateral PRABHJOT  NAVARRO pre renal azotemia, warranted diuresis    Plan:  GFR < 30, would avoid Gadolinium including group II agents such as Gavigast as higher risk for NSF.  Recommend selective renal artery angiogram with option for stenting at time of procedure      Yobani Newby MD I will START or STAY ON the medications listed below when I get home from the hospital:    acetaminophen 325 mg oral tablet  -- 2 tab(s) by mouth every 6 hours, As needed, For Temp greater than 38 C (100.4 F)  -- Indication: For prn for temp    acetaminophen 325 mg oral tablet  -- 2 tab(s) by mouth every 6 hours, As needed, Headache  -- Indication: For prn for pain    traMADol 50 mg oral tablet  -- 0.5 tab(s) by mouth every 4 hours, As needed, Moderate Pain (4 - 6)  -- Indication: For prn for pain    traMADol 50 mg oral tablet  -- 1 tab(s) by mouth every 4 hours, As needed, Severe Pain (7 - 10)  -- Indication: For prn for pain    Cardizem  mg/24 hours oral capsule, extended release  -- 1 cap(s) by mouth once a day  -- Indication: For afib    heparin  -- 5000 unit(s) subcutaneous every 8 hours until INR>2.0 then discontinue  -- Indication: For bridging AC    warfarin 6 mg oral tablet  -- 1 tab(s) by mouth once a day (at bedtime), monitor INR closely to keep 2.0-3.0. Discontinue Heparin SQ once INR is therapeutic  -- Indication: For afib- anticoagulation    divalproex sodium 250 mg oral delayed release tablet  -- 1 tab(s) by mouth 2 times a day  -- Indication: For Dementia without behavioral disturbance, unspecified dementia type    QUEtiapine 25 mg oral tablet  -- 1.5 tab(s) by mouth once a day in the afternoon  -- Indication: For Dementia without behavioral disturbance, unspecified dementia type    QUEtiapine 25 mg oral tablet  -- 2.5 tab(s) by mouth once a day (in the morning)  -- Indication: For Dementia without behavioral disturbance, unspecified dementia type    QUEtiapine 50 mg oral tablet  -- 1.5 tab(s) by mouth once a day (at bedtime)  -- Indication: For Dementia without behavioral disturbance, unspecified dementia type    donepezil 10 mg oral tablet  -- 1 tab(s) by mouth once a day (at bedtime)  -- Indication: For Dementia without behavioral disturbance, unspecified dementia type    docusate sodium 100 mg oral capsule  -- 1 cap(s) by mouth 3 times a day  -- Indication: For bowel regimen    bisacodyl 10 mg rectal suppository  -- 1 suppository(ies) rectally once a day, As needed, If no bowel movement  -- Indication: For bowel regimen    oxybutynin 5 mg oral tablet  -- 1 tab(s) by mouth 2 times a day  -- Indication: For bladder    Multiple Vitamins oral tablet  -- 1 tab(s) by mouth once a day  -- Indication: For vitamin    Vitamin B-12 1000 mcg oral tablet  -- 1 tab(s) by mouth once a day  -- Indication: For vitamin    Vitamin D3 1000 intl units oral capsule  -- 1 cap(s) by mouth once a day  -- Indication: For vitamin

## 2023-12-06 NOTE — PHYSICAL THERAPY INITIAL EVALUATION ADULT - SIT-TO-STAND BALANCE
MOHS Procedure Note    Patient: Erika Dumont  : 1941  MRN: [id number]  RADIOLOGIST AT 7:30AM);   Surgeon: Hayley Oropeza MD;  [facility];  Service: Surgical Oncology    MOHS SURGERY Left 03/15/2023    SCC left frontal scalp-Dr Jones    MOHS SURGERY Right 2023    [address] [place] [address] right clavicle-Dr Jones    [facility] unable

## 2024-02-29 NOTE — DISCHARGE NOTE ADULT - PROVIDER TOKENS
TOKEN:'[id number]' The patient has been re-examined and I agree with the above assessment or I updated with my findings.